# Patient Record
Sex: FEMALE | Race: WHITE | Employment: UNEMPLOYED | URBAN - METROPOLITAN AREA
[De-identification: names, ages, dates, MRNs, and addresses within clinical notes are randomized per-mention and may not be internally consistent; named-entity substitution may affect disease eponyms.]

---

## 2019-06-14 ENCOUNTER — ANCILLARY PROCEDURE (OUTPATIENT)
Dept: MAMMOGRAPHY | Facility: CLINIC | Age: 39
End: 2019-06-14
Payer: COMMERCIAL

## 2019-06-14 ENCOUNTER — OFFICE VISIT (OUTPATIENT)
Dept: OBGYN | Facility: CLINIC | Age: 39
End: 2019-06-14
Payer: COMMERCIAL

## 2019-06-14 VITALS — DIASTOLIC BLOOD PRESSURE: 78 MMHG | BODY MASS INDEX: 31.95 KG/M2 | WEIGHT: 192 LBS | SYSTOLIC BLOOD PRESSURE: 122 MMHG

## 2019-06-14 DIAGNOSIS — Z12.4 SCREENING FOR CERVICAL CANCER: ICD-10-CM

## 2019-06-14 DIAGNOSIS — Z00.00 ANNUAL PHYSICAL EXAM: ICD-10-CM

## 2019-06-14 DIAGNOSIS — L98.9 SKIN LESION: ICD-10-CM

## 2019-06-14 DIAGNOSIS — Z11.51 SCREENING FOR HUMAN PAPILLOMAVIRUS: Primary | ICD-10-CM

## 2019-06-14 PROCEDURE — 77067 SCR MAMMO BI INCL CAD: CPT | Mod: TC

## 2019-06-14 PROCEDURE — 99386 PREV VISIT NEW AGE 40-64: CPT | Performed by: OBSTETRICS & GYNECOLOGY

## 2019-06-14 RX ORDER — ACETAMINOPHEN AND CODEINE PHOSPHATE 120; 12 MG/5ML; MG/5ML
0.35 SOLUTION ORAL DAILY
Qty: 84 TABLET | Refills: 3 | Status: SHIPPED | OUTPATIENT
Start: 2019-06-14 | End: 2020-04-20

## 2019-06-14 NOTE — PROGRESS NOTES
SUBJECTIVE:                                                      Diana is a 39 year old  female who presents for annual exam.     No LMP recorded (lmp unknown). (Menstrual status: Postpartum).. Menses are not present due to Mirena IUD.  Using IUD for contraception.  She is not currently considering pregnancy.  Besides routine health maintenance,  she would like to discuss birth control. Has had Mirena for 5 years, would like to discuss something else and also bilateral tubal ligation.  GYNECOLOGIC HISTORY:    Diana is sexually active with 1 male partner(s) and is currently in a monogamous relationship.      History sexually transmitted infections:No STD history    History of abnormal Pap smear: NO - age 30-65 PAP every 5 years with negative HPV co-testing recommended  Family history of breast CA: No  Family history of uterine/ovarian CA: No    Family history of colon CA: No      HISTORY:  OB History    Para Term  AB Living   1 1 1 0 0 1   SAB TAB Ectopic Multiple Live Births   0 0 0 0 0      # Outcome Date GA Lbr Todd/2nd Weight Sex Delivery Anes PTL Lv   1 Term 14 37w3d  3.32 kg (7 lb 5.1 oz) M CS-LTranv Spinal        Name: ADÁN PINEDA      Apgar1: 8  Apgar5: 9     Past Medical History:   Diagnosis Date     Abnormal Pap smear      Hypertension      Past Surgical History:   Procedure Laterality Date      SECTION  2014    Procedure:  SECTION;   Section;  Surgeon: Diana Lyons MD;  Location: [facility]     FRACTURE SURGERY  age 5    factured both wrists roller blading     Family History   Problem Relation Age of Onset     Diabetes Paternal Grandfather      Diabetes Maternal Grandfather      Diabetes Father      Myocardial Infarction Father      Social History     Socioeconomic History     Marital status:      Spouse name: None     Number of children: None     Years of education: None     Highest education level: None   Occupational  History     None   Social Needs     Financial resource strain: None     Food insecurity:     Worry: None     Inability: None     Transportation needs:     Medical: None     Non-medical: None   Tobacco Use     Smoking status: Current Every Day Smoker     Packs/day: 0.50     Years: 15.00     Pack years: 7.50     Last attempt to quit: 2013     Years since quittin.9     Smokeless tobacco: Never Used   Substance and Sexual Activity     Alcohol use: Yes     Comment: before finding out she was pregnant     Drug use: No     Sexual activity: Yes     Partners: Male   Lifestyle     Physical activity:     Days per week: None     Minutes per session: None     Stress: None   Relationships     Social connections:     Talks on phone: None     Gets together: None     Attends [employer] service: None     Active member of club or organization: None     Attends meetings of clubs or organizations: None     Relationship status: None     Intimate partner violence:     Fear of current or ex partner: None     Emotionally abused: None     Physically abused: None     Forced sexual activity: None   Other Topics Concern     Parent/sibling w/ CABG, MI or angioplasty before 65F 55M? Not Asked   Social History Narrative     None       Current Outpatient Medications:      Multiple Vitamin (MULTI-VITAMIN PO), Take 1 tablet by mouth daily, Disp: , Rfl:      norethindrone (MICRONOR) 0.35 MG tablet, Take 1 tablet (0.35 mg) by mouth daily, Disp: 84 tablet, Rfl: 3     ferrous sulfate 325 (65 FE) MG tablet, Take 1 tablet (325 mg) by mouth 3 times daily (with meals) (Patient not taking: Reported on 2019), Disp: 90 tablet, Rfl: 2     folic acid (FOLVITE) 400 MCG tablet, Take 400 mcg by mouth daily, Disp: , Rfl:      NIFEdipine osmotic (PROCARDIA XL) 30 MG 24 hr tablet, Take 1 tablet (30 mg) by mouth daily, Disp: 30 tablet, Rfl: 0     oxyCODONE-acetaminophen (PERCOCET) 5-325 MG per tablet, Take 1-2 tablets by mouth every 4 hours as needed  (Patient not taking: Reported on 6/14/2019), Disp: 40 tablet, Rfl: 0     senna-docusate (SENOKOT-S;PERICOLACE) 8.6-50 MG per tablet, Take 1 tablet by mouth 2 times daily (Patient not taking: Reported on 6/14/2019), Disp: 60 tablet, Rfl: 1   No Known Allergies    Past medical, surgical, social and family history were reviewed and updated in EPIC.    ROS:   12 point review of systems negative other than symptoms noted below.      OBJECTIVE:                                                      EXAM:  /78 (BP Location: Right arm, Patient Position: Chair, Cuff Size: Adult Large)   Wt 87.1 kg (192 lb)   LMP  (LMP Unknown)   Breastfeeding? No   BMI 31.95 kg/m     BMI: Body mass index is 31.95 kg/m .  General: Alert and oriented, no distress.  Psychiatric: Mood and affect within normal limits.  Skin: Warm and dry, no lesions, rashes or discolorations.  Neck: Neck supple. Thyroid palpbably normal in size and without nodularity.  Cardiovascular: Regular rate and rhythm, no murmurs, rubs or gallops.   Lungs:  Clear to auscultation bilaterally, breathing is unlabored.  Breasts:  Symmetric, no skin changes.  No dominant masses bilaterally.   Lymph:  No cervical, supraclavicular, infraclavicular, axillary or inguinal lymphadenopathy palpable.   Abdomen: Soft, nontender, no hepatosplenomegaly, no rebound or guarding, no masses, no hernias.   Vulva:  No external lesions, normal female hair distribution, no inguinal adenopathy.    Urethra:  Midline, non-tender, well supported, no discharge  Vagina:  Well-estrogenized, no abnormal discharge, no lesions  Cervix: no lesions, no discharge and IUD strings easily seen. The strings are grasped and the IUD removed easily and found to be intact.  Uterus:  anteverted, smooth contour, without enlargement, mobile, and without tenderness  Ovaries:  No masses appreciated, non-tender, mobile  Rectal Exam: deferred  Musculoskeletal: extremities normal. Small skin lesion on her right shin,  bothers her and she would like derm referral.      COUNSELING:   Reviewed preventive health counseling, as reflected in patient instructions  Special attention given to:        Regular exercise       Healthy diet/nutrition       Contraception       Family planning   reports that she has been smoking.  She has a 7.50 pack-year smoking history. She has never used smokeless tobacco.  Tobacco Cessation Action Plan: Information offered: Patient not interested at this time      ASSESSMENT/PLAN:                                                      39 year old female with satisfactory annual exam  (  (L98.9) Skin lesion  Comment:   Plan: DERMATOLOGY REFERRAL            (Z00.00) Annual physical exam  Comment:   Plan: MA Screening Digital Bilateral, norethindrone         (MICRONOR) 0.35 MG tablet          Discussed birth control options: she would like to use progesterone only oral contraceptive pills, as she is not a candidate for estrogen due to age and smoking. She is interested in tubal ligation: risks, benefits, and alternatives discussed and she will consider timing for this.    Mammo ordered.      Edilma Cabello MD

## 2019-06-14 NOTE — NURSING NOTE
"Chief Complaint   Patient presents with     Physical     Pap due     IUD     removal Mirena       Initial /90 (BP Location: Right arm, Patient Position: Chair, Cuff Size: Adult Regular)   Wt 87.1 kg (192 lb)   LMP  (LMP Unknown)   Breastfeeding? No   BMI 31.95 kg/m   Estimated body mass index is 31.95 kg/m  as calculated from the following:    Height as of 14: 1.651 m (5' 5\").    Weight as of this encounter: 87.1 kg (192 lb).  BP completed using cuff size: large    Questioned patient about current smoking habits.  Pt. currently smokes.  Advised about smoking cessation.          The following HM Due: pap smear    Last Pap early   NIL   HPV Neg.        Catarina Fields, CMA on 2019 at 9:57 AM             "

## 2019-07-30 ENCOUNTER — OFFICE VISIT (OUTPATIENT)
Dept: DERMATOLOGY | Facility: CLINIC | Age: 39
End: 2019-07-30
Payer: COMMERCIAL

## 2019-07-30 VITALS — HEART RATE: 77 BPM | DIASTOLIC BLOOD PRESSURE: 81 MMHG | SYSTOLIC BLOOD PRESSURE: 129 MMHG

## 2019-07-30 DIAGNOSIS — L57.8 SOLAR ELASTOSIS: ICD-10-CM

## 2019-07-30 DIAGNOSIS — D48.5 NEOPLASM OF UNCERTAIN BEHAVIOR OF SKIN: Primary | ICD-10-CM

## 2019-07-30 PROCEDURE — 11102 TANGNTL BX SKIN SINGLE LES: CPT | Performed by: PHYSICIAN ASSISTANT

## 2019-07-30 PROCEDURE — 11103 TANGNTL BX SKIN EA SEP/ADDL: CPT | Performed by: PHYSICIAN ASSISTANT

## 2019-07-30 PROCEDURE — 99203 OFFICE O/P NEW LOW 30 MIN: CPT | Mod: 25 | Performed by: PHYSICIAN ASSISTANT

## 2019-07-30 PROCEDURE — 88305 TISSUE EXAM BY PATHOLOGIST: CPT | Mod: TC | Performed by: PHYSICIAN ASSISTANT

## 2019-07-30 NOTE — PATIENT INSTRUCTIONS
Wound Care Instructions     FOR SUPERFICIAL WOUNDS     [facility] [phone]    [facility] [phone]          AFTER 24 HOURS YOU SHOULD REMOVE THE BANDAGE AND BEGIN DAILY DRESSING CHANGES AS FOLLOWS:     1) Remove Dressing.     2) Clean and dry the area with tap water using a Q-tip or sterile gauze pad.     3) Apply Vaseline, Aquaphor, Polysporin ointment or Bacitracin ointment over entire wound.  Do NOT use Neosporin ointment.     4) Cover the wound with a band-aid, or a sterile non-stick gauze pad and micropore paper tape      REPEAT THESE INSTRUCTIONS AT LEAST ONCE A DAY UNTIL THE WOUND HAS COMPLETELY HEALED.    It is an old wives tale that a wound heals better when it is exposed to air and allowed to dry out. The wound will heal faster with a better cosmetic result if it is kept moist with ointment and covered with a bandage.    **Do not let the wound dry out.**      Supplies Needed:      *Cotton tipped applicators (Q-tips)    *Polysporin Ointment or Bacitracin Ointment (NOT NEOSPORIN)    *Band-aids or non-stick gauze pads and micropore paper tape.      PATIENT INFORMATION:    During the healing process you will notice a number of changes. All wounds develop a small halo of redness surrounding the wound.  This means healing is occurring. Severe itching with extensive redness usually indicates sensitivity to the ointment or bandage tape used to dress the wound.  You should call our office if this develops.      Swelling  and/or discoloration around your surgical site is common, particularly when performed around the eye.    All wounds normally drain.  The larger the wound the more drainage there will be.  After 7-10 days, you will notice the wound beginning to shrink and new skin will begin to grow.  The wound is healed when you can see skin has formed over the entire area.  A healed wound has a healthy, shiny look to the surface and is red to dark pink in color to  normalize.  Wounds may take approximately 4-6 weeks to heal.  Larger wounds may take 6-8 weeks.  After the wound is healed you may discontinue dressing changes.    You may experience a sensation of tightness as your wound heals. This is normal and will gradually subside.    Your healed wound may be sensitive to temperature changes. This sensitivity improves with time, but if you re having a lot of discomfort, try to avoid temperature extremes.    Patients frequently experience itching after their wound appears to have healed because of the continue healing under the skin.  Plain Vaseline will help relieve the itching.        POSSIBLE COMPLICATIONS    BLEEDIN. Leave the bandage in place.  2. Use tightly rolled up gauze or a cloth to apply direct pressure over the bandage for 30  minutes.  3. Reapply pressure for an additional 30 minutes if necessary  4. Use additional gauze and tape to maintain pressure once the bleeding has stopped.    Proper skin care from [facility]:    -Eliminate harsh soaps as they strip the natural oils from the skin, often resulting in dry itchy skin ( i.e. Dial, Zest, Japanese Spring)  -Use mild soaps such as Cetaphil or Dove Sensitive Skin in the shower. You do not need to use soap on arms, legs, and trunk every time you shower unless visibly soiled.   -Avoid hot or cold showers.  -After showering, lightly dry off and apply moisturizing within 2-3 minutes. This will help trap moisture in the skin.   -Aggressive use of a moisturizer at least 1-2 times a day to the entire body (including -Vanicream, Cetaphil, Aquaphor or Cerave) and moisturize hands after every washing.  -We recommend using moisturizers that come in a tub that needs to be scooped out, not a pump. This has more of an oil base. It will hold moisture in your skin much better than a water base moisturizer. The above recommended are non-pore clogging.      Wear a sunscreen with at least SPF 30 on your face, ears, neck  and V of the chest daily. Wear sunscreen on other areas of the body if those areas are exposed to the sun throughout the day. Sunscreens can contain physical and/or chemical blockers. Physical blockers are less likely to clog pores, these include zinc oxide and titanium dioxide. Reapply every two hour and after swimming. Sunscreen examples include Neutrogena, CeraVe, Blue Lizard, Elta MD and many others.    UV radiation  UVA radiation remains constant throughout the day and throughout the year. It is a longer wavelength than UVB and therefore penetrates deeper into the skin leading to immediate and delayed tanning, photoaging, and skin cancer. 70-80% of UVA and UVB radiation occurs between the hours of 10am-2pm.  UVB radiation  UVB radiation causes the most harmful effects and is more significant during the summer months. However, snow and ice can reflect UVB radiation leading to skin damage during the winter months as well. UVB radiation is responsible for tanning, burning, inflammation, delayed erythema (pinkness), pigmentation (brown spots), and skin cancer.

## 2019-07-30 NOTE — LETTER
2019         RE: Diana Ferrera  [address]  [place] [id number]        Dear Colleague,    Thank you for referring your patient, Diana Ferrera, to the [facility]. Please see a copy of my visit note below.    HPI:  Diana Ferrera is a 39 year old female patient here today for spot on right leg .  Patient states this has been present for a while.  Patient reports the following symptoms: bleeds when she hits with razor .  Patient reports the following previous treatments: none.  Patient reports the following modifying factors: none.  Associated symptoms: none. Also has a mole on left neck. Irritated. Would like it checked.  Patient has no other skin complaints today.  Remainder of the HPI, Meds, PMH, Allergies, FH, and SH was reviewed in chart.    Pertinent Hx:   No personal or family history of skin cancer    Past Medical History:   Diagnosis Date     Abnormal Pap smear      Hypertension        Past Surgical History:   Procedure Laterality Date      SECTION  2014    Procedure:  SECTION;   Section;  Surgeon: Diana Lyons MD;  Location: [facility]     FRACTURE SURGERY  age 5    factured both wrists roller blading        Family History   Problem Relation Age of Onset     Diabetes Paternal Grandfather      Diabetes Maternal Grandfather      Diabetes Father      Myocardial Infarction Father        Social History     Socioeconomic History     Marital status:      Spouse name: Not on file     Number of children: Not on file     Years of education: Not on file     Highest education level: Not on file   Occupational History     Not on file   Social Needs     Financial resource strain: Not on file     Food insecurity:     Worry: Not on file     Inability: Not on file     Transportation needs:     Medical: Not on file     Non-medical: Not on file   Tobacco Use     Smoking status: Current Every Day Smoker     Packs/day: 0.50     Years: 15.00      Pack years: 7.50     Last attempt to quit: 2013     Years since quittin.0     Smokeless tobacco: Never Used   Substance and Sexual Activity     Alcohol use: Yes     Comment: before finding out she was pregnant     Drug use: No     Sexual activity: Yes     Partners: Male   Lifestyle     Physical activity:     Days per week: Not on file     Minutes per session: Not on file     Stress: Not on file   Relationships     Social connections:     Talks on phone: Not on file     Gets together: Not on file     Attends Hindu service: Not on file     Active member of club or organization: Not on file     Attends meetings of clubs or organizations: Not on file     Relationship status: Not on file     Intimate partner violence:     Fear of current or ex partner: Not on file     Emotionally abused: Not on file     Physically abused: Not on file     Forced sexual activity: Not on file   Other Topics Concern     Parent/sibling w/ CABG, MI or angioplasty before 65F 55M? Not Asked   Social History Narrative     Not on file       Outpatient Encounter Medications as of 2019   Medication Sig Dispense Refill     folic acid (FOLVITE) 400 MCG tablet Take 400 mcg by mouth daily       Multiple Vitamin (MULTI-VITAMIN PO) Take 1 tablet by mouth daily       norethindrone (MICRONOR) 0.35 MG tablet Take 1 tablet (0.35 mg) by mouth daily 84 tablet 3     ferrous sulfate 325 (65 FE) MG tablet Take 1 tablet (325 mg) by mouth 3 times daily (with meals) (Patient not taking: Reported on 2019) 90 tablet 2     NIFEdipine osmotic (PROCARDIA XL) 30 MG 24 hr tablet Take 1 tablet (30 mg) by mouth daily 30 tablet 0     oxyCODONE-acetaminophen (PERCOCET) 5-325 MG per tablet Take 1-2 tablets by mouth every 4 hours as needed (Patient not taking: Reported on 2019) 40 tablet 0     senna-docusate (SENOKOT-S;PERICOLACE) 8.6-50 MG per tablet Take 1 tablet by mouth 2 times daily (Patient not taking: Reported on 2019) 60 tablet 1     No  facility-administered encounter medications on file as of 7/30/2019.        Review Of Systems:  Skin: As above  Eyes: negative  Ears/Nose/Throat: negative  Respiratory: No shortness of breath, dyspnea on exertion, cough, or hemoptysis  Cardiovascular: negative  Gastrointestinal: negative  Genitourinary: negative  Musculoskeletal: negative  Neurologic: negative  Psychiatric: negative  Hematologic/Lymphatic/Immunologic: negative  Endocrine: negative      Objective:     /81   Pulse 77   Eyes: Conjunctivae/lids: Normal   ENT: Lips:  Normal  MSK: Normal  Cardiovascular: Peripheral edema none  Pulm: Breathing Normal  Neuro/Psych: Orientation: Normal; Mood/Affect: Normal, NAD, WDWN  Pt accompanied by: self  Following areas examined: face, neck, right and left legs  Kraft skin type:ii   Findings:  Rhytides, hypo/hyperpigmentation, and atrophy  Pink/tan smooth papule on left lateral neck 0.3cm  Brown scaly papule on right ventral distal leg 0.4cm    Assessment and Plan:  1) 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  No complications and routine wound care.  May grow back and will get a scar. Based on lesion type may need to completely remove lesion. Patient will be notified in 7-10 days of results. Wound care directions given.    2) Neoplasm of uncertain behavior left lateral neck 0.3cm  Rule out irritated nevus doubt atypica   TANGENTIAL BIOPSY:  After consent, anesthesia with LEC and prep, tangential biopsy performed.  No complications and routine wound care.  May grow back and will get a scar. Based on lesion type may need to completely remove lesion. Patient will be notified in 7-10 days of results. Wound care directions given.      3) solar elastosis    Signs and Symptoms of non-melanoma skin cancer and ABCDEs of melanoma reviewed with patient. Patient encouraged to perform monthly  self skin exams and educated on how to perform them. UV precautions reviewed with patient. Patient was asked about new or changing moles/lesions on body.   Wear a sunscreen with at least SPF 30 on your face, ears, neck and V of the chest daily. Wear sunscreen on other areas of the body if those areas are exposed to the sun throughout the day. Sunscreens can contain physical and/or chemical blockers. Physical blockers are less likely to clog pores, these include zinc oxide and titanium dioxide. Reapply every two hour and after swimming. Sunscreen examples include Neutrogena, CeraVe, Blue Lizard, Elta MD and many others.      Follow up in yearly FBE      Again, thank you for allowing me to participate in the care of your patient.        Sincerely,        Mali Mcgraw PA-C

## 2019-07-30 NOTE — PROGRESS NOTES
HPI:  Diana Ferrera is a 39 year old female patient here today for spot on right leg .  Patient states this has been present for a while.  Patient reports the following symptoms: bleeds when she hits with razor .  Patient reports the following previous treatments: none.  Patient reports the following modifying factors: none.  Associated symptoms: none. Also has a mole on left neck. Irritated. Would like it checked.  Patient has no other skin complaints today.  Remainder of the HPI, Meds, PMH, Allergies, FH, and SH was reviewed in chart.    Pertinent Hx:   No personal or family history of skin cancer    Past Medical History:   Diagnosis Date     Abnormal Pap smear      Hypertension        Past Surgical History:   Procedure Laterality Date      SECTION  2014    Procedure:  SECTION;   Section;  Surgeon: Diana Lyons MD;  Location: [facility]     FRACTURE SURGERY  age 5    factured both wrists roller blading        Family History   Problem Relation Age of Onset     Diabetes Paternal Grandfather      Diabetes Maternal Grandfather      Diabetes Father      Myocardial Infarction Father        Social History     Socioeconomic History     Marital status:      Spouse name: Not on file     Number of children: Not on file     Years of education: Not on file     Highest education level: Not on file   Occupational History     Not on file   Social Needs     Financial resource strain: Not on file     Food insecurity:     Worry: Not on file     Inability: Not on file     Transportation needs:     Medical: Not on file     Non-medical: Not on file   Tobacco Use     Smoking status: Current Every Day Smoker     Packs/day: 0.50     Years: 15.00     Pack years: 7.50     Last attempt to quit: 2013     Years since quittin.0     Smokeless tobacco: Never Used   Substance and Sexual Activity     Alcohol use: Yes     Comment: before finding out she was pregnant     Drug use: No     Sexual  activity: Yes     Partners: Male   Lifestyle     Physical activity:     Days per week: Not on file     Minutes per session: Not on file     Stress: Not on file   Relationships     Social connections:     Talks on phone: Not on file     Gets together: Not on file     Attends Moravian service: Not on file     Active member of club or organization: Not on file     Attends meetings of clubs or organizations: Not on file     Relationship status: Not on file     Intimate partner violence:     Fear of current or ex partner: Not on file     Emotionally abused: Not on file     Physically abused: Not on file     Forced sexual activity: Not on file   Other Topics Concern     Parent/sibling w/ CABG, MI or angioplasty before 65F 55M? Not Asked   Social History Narrative     Not on file       Outpatient Encounter Medications as of 7/30/2019   Medication Sig Dispense Refill     folic acid (FOLVITE) 400 MCG tablet Take 400 mcg by mouth daily       Multiple Vitamin (MULTI-VITAMIN PO) Take 1 tablet by mouth daily       norethindrone (MICRONOR) 0.35 MG tablet Take 1 tablet (0.35 mg) by mouth daily 84 tablet 3     ferrous sulfate 325 (65 FE) MG tablet Take 1 tablet (325 mg) by mouth 3 times daily (with meals) (Patient not taking: Reported on 6/14/2019) 90 tablet 2     NIFEdipine osmotic (PROCARDIA XL) 30 MG 24 hr tablet Take 1 tablet (30 mg) by mouth daily 30 tablet 0     oxyCODONE-acetaminophen (PERCOCET) 5-325 MG per tablet Take 1-2 tablets by mouth every 4 hours as needed (Patient not taking: Reported on 6/14/2019) 40 tablet 0     senna-docusate (SENOKOT-S;PERICOLACE) 8.6-50 MG per tablet Take 1 tablet by mouth 2 times daily (Patient not taking: Reported on 6/14/2019) 60 tablet 1     No facility-administered encounter medications on file as of 7/30/2019.        Review Of Systems:  Skin: As above  Eyes: negative  Ears/Nose/Throat: negative  Respiratory: No shortness of breath, dyspnea on exertion, cough, or  hemoptysis  Cardiovascular: negative  Gastrointestinal: negative  Genitourinary: negative  Musculoskeletal: negative  Neurologic: negative  Psychiatric: negative  Hematologic/Lymphatic/Immunologic: negative  Endocrine: negative      Objective:     /81   Pulse 77   Eyes: Conjunctivae/lids: Normal   ENT: Lips:  Normal  MSK: Normal  Cardiovascular: Peripheral edema none  Pulm: Breathing Normal  Neuro/Psych: Orientation: Normal; Mood/Affect: Normal, NAD, WDWN  Pt accompanied by: self  Following areas examined: face, neck, right and left legs  Kraft skin type:ii   Findings:  Rhytides, hypo/hyperpigmentation, and atrophy  Pink/tan smooth papule on left lateral neck 0.3cm  Brown scaly papule on right ventral distal leg 0.4cm    Assessment and Plan:  1) 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  No complications and routine wound care.  May grow back and will get a scar. Based on lesion type may need to completely remove lesion. Patient will be notified in 7-10 days of results. Wound care directions given.    2) Neoplasm of uncertain behavior left lateral neck 0.3cm  Rule out irritated nevus doubt atypica   TANGENTIAL BIOPSY:  After consent, anesthesia with LEC and prep, tangential biopsy performed.  No complications and routine wound care.  May grow back and will get a scar. Based on lesion type may need to completely remove lesion. Patient will be notified in 7-10 days of results. Wound care directions given.      3) solar elastosis    Signs and Symptoms of non-melanoma skin cancer and ABCDEs of melanoma reviewed with patient. Patient encouraged to perform monthly self skin exams and educated on how to perform them. UV precautions reviewed with patient. Patient was asked about new or changing moles/lesions on body.   Wear a sunscreen with at least SPF 30 on your face, ears, neck and V  of the chest daily. Wear sunscreen on other areas of the body if those areas are exposed to the sun throughout the day. Sunscreens can contain physical and/or chemical blockers. Physical blockers are less likely to clog pores, these include zinc oxide and titanium dioxide. Reapply every two hour and after swimming. Sunscreen examples include Neutrogena, CeraVe, Blue Lizard, Elta MD and many others.      Follow up in yearly [facility]BE

## 2019-08-02 LAB — COPATH REPORT: NORMAL

## 2019-08-05 ENCOUNTER — TELEPHONE (OUTPATIENT)
Dept: DERMATOLOGY | Facility: CLINIC | Age: 39
End: 2019-08-05

## 2019-08-05 NOTE — TELEPHONE ENCOUNTER
----- Message from Darin Marcelo MD sent at 8/5/2019  7:16 AM CDT -----  FINAL DIAGNOSIS:   A. Shave, right ventral distal leg:   - Benign dermal spindle cell proliferation, consistent with dermatofibroma    - (see description)     B. Shave, left lateral neck:   - Intradermal melanocytic nevus - (see description)     No further treatment

## 2019-08-05 NOTE — TELEPHONE ENCOUNTER
Called and LM for patient to call back in regards to biopsy results lex Merrill RN-BSN  [facility]  [phone]

## 2019-08-06 NOTE — TELEPHONE ENCOUNTER
Called and LM for patient to call back in regards to biopsy results lex Merrill RN-BSN  [facility]  [phone]

## 2019-08-06 NOTE — TELEPHONE ENCOUNTER
Called and LM for patient to call back in regards to biopsy results lex Merrill RN-BSN  [facility]  [phone]

## 2019-08-07 NOTE — TELEPHONE ENCOUNTER
Called and spoke to patient. Educated patient on biopsy results- dermatofibroma + intradermal melanocytic nevus (both benign). No further treatment needed, patient voiced understanding.    Garfield RN-BSN  [facility]  [phone]

## 2020-03-02 ENCOUNTER — HEALTH MAINTENANCE LETTER (OUTPATIENT)
Age: 40
End: 2020-03-02

## 2020-08-17 ENCOUNTER — ANCILLARY PROCEDURE (OUTPATIENT)
Dept: MAMMOGRAPHY | Facility: CLINIC | Age: 40
End: 2020-08-17
Payer: COMMERCIAL

## 2020-08-17 DIAGNOSIS — Z12.31 VISIT FOR SCREENING MAMMOGRAM: ICD-10-CM

## 2020-08-17 PROCEDURE — 77067 SCR MAMMO BI INCL CAD: CPT | Mod: TC

## 2020-08-17 PROCEDURE — 77063 BREAST TOMOSYNTHESIS BI: CPT | Mod: TC

## 2020-09-25 ENCOUNTER — OFFICE VISIT (OUTPATIENT)
Dept: OBGYN | Facility: CLINIC | Age: 40
End: 2020-09-25
Payer: COMMERCIAL

## 2020-09-25 VITALS
DIASTOLIC BLOOD PRESSURE: 92 MMHG | SYSTOLIC BLOOD PRESSURE: 138 MMHG | BODY MASS INDEX: 29.99 KG/M2 | HEIGHT: 65 IN | WEIGHT: 180 LBS

## 2020-09-25 DIAGNOSIS — Z12.4 PAP SMEAR FOR CERVICAL CANCER SCREENING: ICD-10-CM

## 2020-09-25 DIAGNOSIS — Z01.419 WOMEN'S ANNUAL ROUTINE GYNECOLOGICAL EXAMINATION: Primary | ICD-10-CM

## 2020-09-25 DIAGNOSIS — Z00.00 ANNUAL PHYSICAL EXAM: ICD-10-CM

## 2020-09-25 DIAGNOSIS — Z23 NEED FOR PROPHYLACTIC VACCINATION AND INOCULATION AGAINST INFLUENZA: ICD-10-CM

## 2020-09-25 PROCEDURE — 90471 IMMUNIZATION ADMIN: CPT | Performed by: OBSTETRICS & GYNECOLOGY

## 2020-09-25 PROCEDURE — G0145 SCR C/V CYTO,THINLAYER,RESCR: HCPCS | Performed by: OBSTETRICS & GYNECOLOGY

## 2020-09-25 PROCEDURE — 90686 IIV4 VACC NO PRSV 0.5 ML IM: CPT | Performed by: OBSTETRICS & GYNECOLOGY

## 2020-09-25 PROCEDURE — 87624 HPV HI-RISK TYP POOLED RSLT: CPT | Performed by: OBSTETRICS & GYNECOLOGY

## 2020-09-25 PROCEDURE — 99396 PREV VISIT EST AGE 40-64: CPT | Mod: 25 | Performed by: OBSTETRICS & GYNECOLOGY

## 2020-09-25 RX ORDER — OMEGA-3/DHA/EPA/FISH OIL 60 MG-90MG
CAPSULE ORAL
COMMUNITY
End: 2024-04-03

## 2020-09-25 RX ORDER — ACETAMINOPHEN AND CODEINE PHOSPHATE 120; 12 MG/5ML; MG/5ML
0.35 SOLUTION ORAL DAILY
Qty: 84 TABLET | Refills: 3 | Status: SHIPPED | OUTPATIENT
Start: 2020-09-25 | End: 2020-12-29

## 2020-09-25 ASSESSMENT — MIFFLIN-ST. JEOR: SCORE: 1477.35

## 2020-09-25 NOTE — NURSING NOTE
"Chief Complaint   Patient presents with     Gyn Exam     Annual- costo pain        Initial BP (!) 138/92 (BP Location: Left arm, Patient Position: Sitting, Cuff Size: Adult Large)   Ht 1.651 m (5' 5\")   Wt 81.6 kg (180 lb)   LMP  (LMP Unknown)   BMI 29.95 kg/m   Estimated body mass index is 29.95 kg/m  as calculated from the following:    Height as of this encounter: 1.651 m (5' 5\").    Weight as of this encounter: 81.6 kg (180 lb).  BP completed using cuff size: regular    Questioned patient about current smoking habits.  Pt. has never smoked.          The following HM Due: NONE    Ke Bhakta CMA                "

## 2020-09-25 NOTE — PROGRESS NOTES
SUBJECTIVE:                                                      Diana is a 40 year old  female who presents for annual exam.     No LMP recorded (lmp unknown). (Menstrual status: Irregular Periods).. Menses are regular q 28-30 days and normal lasting 4-5 days.  Using oral contraceptives for contraception.  She is not currently considering pregnancy.  Besides routine health maintenance,  she would like to discuss switching back to an IUD. She liked the Mirena and doesn't like taking a pill every day.  GYNECOLOGIC HISTORY:    Diana is sexually active with 1 male partner(s) and is currently in a monogamous relationship.      History sexually transmitted infections:No STD history    History of abnormal Pap smear: NO - age 30-65 PAP every 5 years with negative HPV co-testing recommended  Family history of breast CA: No  Family history of uterine/ovarian CA: No    Family history of colon CA: No      HISTORY:  OB History    Para Term  AB Living   1 1 1 0 0 1   SAB TAB Ectopic Multiple Live Births   0 0 0 0 0      # Outcome Date GA Lbr Todd/2nd Weight Sex Delivery Anes PTL Lv   1 Term 14 37w3d  3.32 kg (7 lb 5.1 oz) M CS-LTranv Spinal        Name: ADÁN PINEDA      Apgar1: 8  Apgar5: 9     Past Medical History:   Diagnosis Date     Abnormal Pap smear      Hypertension      Past Surgical History:   Procedure Laterality Date      SECTION  2014    Procedure:  SECTION;   Section;  Surgeon: Diana Lyons MD;  [facility]     FRACTURE SURGERY  age 5    factured both wrists roller blading     Family History   Problem Relation Age of Onset     Diabetes Paternal Grandfather      Diabetes Maternal Grandfather      Diabetes Father      Myocardial Infarction Father      Social History     Socioeconomic History     Marital status:      Spouse name: None     Number of children: None     Years of education: None     Highest education level: None    Occupational History     None   Social Needs     Financial resource strain: None     Food insecurity     Worry: None     Inability: None     Transportation needs     Medical: None     Non-medical: None   Tobacco Use     Smoking status: Current Every Day Smoker     Packs/day: 0.50     Years: 15.00     Pack years: 7.50     Last attempt to quit: 2013     Years since quittin.2     Smokeless tobacco: Never Used   Substance and Sexual Activity     Alcohol use: Yes     Comment: before finding out she was pregnant     Drug use: No     Sexual activity: Yes     Partners: Male   Lifestyle     Physical activity     Days per week: None     Minutes per session: None     Stress: None   Relationships     Social connections     Talks on phone: None     Gets together: None     Attends Voodoo service: None     Active member of club or organization: None     Attends meetings of clubs or organizations: None     Relationship status: None     Intimate partner violence     Fear of current or ex partner: None     Emotionally abused: None     Physically abused: None     Forced sexual activity: None   Other Topics Concern     Parent/sibling w/ CABG, MI or angioplasty before 65F 55M? Not Asked   Social History Narrative     None       Current Outpatient Medications:      Multiple Vitamin (MULTI-VITAMIN PO), Take 1 tablet by mouth daily, Disp: , Rfl:      NORLYDA 0.35 MG tablet, TAKE 1 TABLET BY MOUTH DAILY, Disp: 84 tablet, Rfl: 0     Omega-3 Fatty Acids (FISH OIL) 500 MG CAPS, , Disp: , Rfl:      ferrous sulfate 325 (65 FE) MG tablet, Take 1 tablet (325 mg) by mouth 3 times daily (with meals) (Patient not taking: Reported on 2020), Disp: 90 tablet, Rfl: 2     folic acid (FOLVITE) 400 MCG tablet, Take 400 mcg by mouth daily, Disp: , Rfl:    No Known Allergies    Past medical, surgical, social and family history were reviewed and updated in EPIC.    ROS:   12 point review of systems negative other than symptoms noted  "below.      OBJECTIVE:                                                      EXAM:  BP (!) 138/92 (BP Location: Left arm, Patient Position: Sitting, Cuff Size: Adult Large)   Ht 1.651 m (5' 5\")   Wt 81.6 kg (180 lb)   LMP  (LMP Unknown)   BMI 29.95 kg/m     BMI: Body mass index is 29.95 kg/m .  General: Alert and oriented, no distress.  Psychiatric: Mood and affect within normal limits.  Skin: Warm and dry, no lesions, rashes or discolorations.  Neck: Neck supple. Thyroid palpbably normal in size and without nodularity.  Cardiovascular: Regular rate and rhythm, no murmurs, rubs or gallops.   Lungs:  Clear to auscultation bilaterally, breathing is unlabored.  Breasts:  Symmetric, no skin changes.  No dominant masses bilaterally.   Lymph:  No cervical, supraclavicular, infraclavicular, axillary or inguinal lymphadenopathy palpable.   Abdomen: Soft, nontender, no hepatosplenomegaly, no rebound or guarding, no masses, no hernias.   Vulva:  No external lesions, normal female hair distribution, no inguinal adenopathy.    Urethra:  Midline, non-tender, well supported, no discharge  Vagina:  Well-estrogenized, no abnormal discharge, no lesions  Cervix: no lesions, no discharge  Uterus:  anteverted, smooth contour, without enlargement, mobile, and without tenderness  Ovaries:  No masses appreciated, non-tender, mobile  Rectal Exam: deferred  Musculoskeletal: extremities normal      COUNSELING:   Reviewed preventive health counseling, as reflected in patient instructions       Regular exercise       Healthy diet/nutrition       Contraception   reports that she has been smoking. She has a 7.50 pack-year smoking history. She has never used smokeless tobacco.  Tobacco Cessation Action Plan: Information offered: Patient not interested at this time      ASSESSMENT/PLAN:                                                      40 year old female with satisfactory annual exam  (Z01.419) Women's annual routine gynecological " examination  (primary encounter diagnosis)  Comment:   Plan: pap and HPV today. Mammo yearly.    (Z00.00) Annual physical exam  Comment:   Plan: norethindrone (NORLYDA) 0.35 MG tablet        Will renew this, plans to come back for Mirena IUD. Risks, benefits, and alternatives discussed and she has used this before and has no questions.    (Z12.4) Pap smear for cervical cancer screening  Comment:   Plan: Pap imaged thin layer screen with HPV -         recommended age 30 - 65 years (select HPV order        below), HPV High Risk Types DNA Cervical            (Z23) Need for prophylactic vaccination and inoculation against influenza  Comment:   Plan: INFLUENZA VACCINE IM > 6 MONTHS VALENT IIV4         [12446], Vaccine Administration, Initial         [74948]              Edilma Cabello MD

## 2020-09-30 LAB
COPATH REPORT: NORMAL
PAP: NORMAL

## 2020-10-01 LAB
FINAL DIAGNOSIS: NORMAL
HPV HR 12 DNA CVX QL NAA+PROBE: NEGATIVE
HPV16 DNA SPEC QL NAA+PROBE: NEGATIVE
HPV18 DNA SPEC QL NAA+PROBE: NEGATIVE
SPECIMEN DESCRIPTION: NORMAL
SPECIMEN SOURCE CVX/VAG CYTO: NORMAL

## 2020-10-07 ENCOUNTER — PATIENT OUTREACH (OUTPATIENT)
Dept: OBGYN | Facility: CLINIC | Age: 40
End: 2020-10-07

## 2020-10-07 NOTE — TELEPHONE ENCOUNTER
1995 Branch: mild dysplasia  1/1996 LEEP  2004, 2005, 2007, 2007 - all NIL paps  11/28/08 LSIL Pap  12/15/08 Branch Bx: YAO 2-3. Plan LEEP  1/29/09 LEEP: YAO 2-3, margins neg. Plan yearly paps until 2029  1/7/10 NIL pap, neg HR HPV  12/6/10 NIL pap, neg HR HPV  4/16/12 ASCUS pap, neg HR HPV  2/9/16 NIL pap, neg HR HPV  9/25/20 NIL pap, neg HR HPV. Plan 1 year cotest

## 2020-11-05 ENCOUNTER — OFFICE VISIT (OUTPATIENT)
Dept: OBGYN | Facility: CLINIC | Age: 40
End: 2020-11-05
Payer: COMMERCIAL

## 2020-11-05 VITALS
HEIGHT: 65 IN | SYSTOLIC BLOOD PRESSURE: 126 MMHG | BODY MASS INDEX: 30.16 KG/M2 | DIASTOLIC BLOOD PRESSURE: 78 MMHG | WEIGHT: 181 LBS

## 2020-11-05 DIAGNOSIS — Z30.430 ENCOUNTER FOR INSERTION OF INTRAUTERINE CONTRACEPTIVE DEVICE: Primary | ICD-10-CM

## 2020-11-05 PROCEDURE — 58300 INSERT INTRAUTERINE DEVICE: CPT | Performed by: OBSTETRICS & GYNECOLOGY

## 2020-11-05 ASSESSMENT — MIFFLIN-ST. JEOR: SCORE: 1481.89

## 2020-11-05 NOTE — NURSING NOTE
"Chief Complaint   Patient presents with     IUD       Initial /78   Ht 1.651 m (5' 5\")   Wt 82.1 kg (181 lb)   LMP 10/05/2020   Breastfeeding No   BMI 30.12 kg/m   Estimated body mass index is 30.12 kg/m  as calculated from the following:    Height as of this encounter: 1.651 m (5' 5\").    Weight as of this encounter: 82.1 kg (181 lb).  BP completed using cuff size: regular    Questioned patient about current smoking habits.  Pt. has never smoked.          The following HM Due: NONE      The following patient reported/Care Every where data was sent to:  P ABSTRACT QUALITY INITIATIVES [[id number]]  Mali Dockery LPN               "

## 2020-11-05 NOTE — PROGRESS NOTES
INDICATIONS:                                                      Diana Ferrera is a 40 year old female,   who presents for insertion of an IUD. The risks, benefits and alternatives of IUD insertion were discussed in detail previously. She also has reviewed the product brochure.  She has elected to go ahead with the insertion today and her questions were answered. Consent was signed.  LMP Patient's last menstrual period was 10/05/2020. and was normal in duration and amount of flow. A pregnancy was performed today:  No    PROCEDURE:                                                      The pelvic exam revealed normal external genitalia. On bimanual exam the uterus was Anteverted and normal in size with no tenderness present. A speculum was inserted into the vagina and the cervix was visualized. The cervix was prepped with Betadine . The anterior lip of the cervix was grasped with a single toothed tenaculum. The uterus sounded to 7 cm. A Mirena IUD was then inserted without difficulty. The string was cut to 3 cm.  The patient experienced a mild amount of cramping.    POST PROCEDURE:                                                      She  tolerated the procedure well. There were no complications. Patient was discharged in stable condition.    Return to clinic in 4-6 weeks for IUD check.  Call if severe cramping, fever, abnormal bleeding, abnormal discharge or pelvic pain develop.  Patient was counseled about the chance of irregular bleeding.    Edilma Cabello MD

## 2020-11-30 ENCOUNTER — ANCILLARY PROCEDURE (OUTPATIENT)
Dept: GENERAL RADIOLOGY | Facility: CLINIC | Age: 40
End: 2020-11-30
Attending: FAMILY MEDICINE
Payer: COMMERCIAL

## 2020-11-30 ENCOUNTER — OFFICE VISIT (OUTPATIENT)
Dept: URGENT CARE | Facility: URGENT CARE | Age: 40
End: 2020-11-30
Payer: COMMERCIAL

## 2020-11-30 ENCOUNTER — NURSE TRIAGE (OUTPATIENT)
Dept: NURSING | Facility: CLINIC | Age: 40
End: 2020-11-30

## 2020-11-30 VITALS
DIASTOLIC BLOOD PRESSURE: 80 MMHG | TEMPERATURE: 98.2 F | SYSTOLIC BLOOD PRESSURE: 114 MMHG | RESPIRATION RATE: 18 BRPM | WEIGHT: 180 LBS | HEIGHT: 65 IN | OXYGEN SATURATION: 99 % | BODY MASS INDEX: 29.99 KG/M2 | HEART RATE: 95 BPM

## 2020-11-30 DIAGNOSIS — R07.9 CHEST PAIN, UNSPECIFIED TYPE: Primary | ICD-10-CM

## 2020-11-30 LAB
BASOPHILS # BLD AUTO: 0 10E9/L (ref 0–0.2)
BASOPHILS NFR BLD AUTO: 0.3 %
D DIMER PPP FEU-MCNC: 0.4 UG/ML FEU (ref 0–0.5)
DIFFERENTIAL METHOD BLD: NORMAL
EOSINOPHIL # BLD AUTO: 0.1 10E9/L (ref 0–0.7)
EOSINOPHIL NFR BLD AUTO: 1.5 %
ERYTHROCYTE [DISTWIDTH] IN BLOOD BY AUTOMATED COUNT: 12.7 % (ref 10–15)
HCT VFR BLD AUTO: 46.6 % (ref 35–47)
HGB BLD-MCNC: 15.5 G/DL (ref 11.7–15.7)
LYMPHOCYTES # BLD AUTO: 2.7 10E9/L (ref 0.8–5.3)
LYMPHOCYTES NFR BLD AUTO: 29.6 %
MCH RBC QN AUTO: 32.2 PG (ref 26.5–33)
MCHC RBC AUTO-ENTMCNC: 33.3 G/DL (ref 31.5–36.5)
MCV RBC AUTO: 97 FL (ref 78–100)
MONOCYTES # BLD AUTO: 0.6 10E9/L (ref 0–1.3)
MONOCYTES NFR BLD AUTO: 6.6 %
NEUTROPHILS # BLD AUTO: 5.7 10E9/L (ref 1.6–8.3)
NEUTROPHILS NFR BLD AUTO: 62 %
PLATELET # BLD AUTO: 228 10E9/L (ref 150–450)
RBC # BLD AUTO: 4.81 10E12/L (ref 3.8–5.2)
TROPONIN I SERPL-MCNC: <0.015 UG/L (ref 0–0.04)
WBC # BLD AUTO: 9.2 10E9/L (ref 4–11)

## 2020-11-30 PROCEDURE — 85379 FIBRIN DEGRADATION QUANT: CPT | Performed by: FAMILY MEDICINE

## 2020-11-30 PROCEDURE — 84484 ASSAY OF TROPONIN QUANT: CPT | Performed by: FAMILY MEDICINE

## 2020-11-30 PROCEDURE — 93000 ELECTROCARDIOGRAM COMPLETE: CPT | Performed by: FAMILY MEDICINE

## 2020-11-30 PROCEDURE — 99204 OFFICE O/P NEW MOD 45 MIN: CPT | Performed by: FAMILY MEDICINE

## 2020-11-30 PROCEDURE — 71046 X-RAY EXAM CHEST 2 VIEWS: CPT | Performed by: RADIOLOGY

## 2020-11-30 PROCEDURE — 36415 COLL VENOUS BLD VENIPUNCTURE: CPT | Performed by: FAMILY MEDICINE

## 2020-11-30 PROCEDURE — 85025 COMPLETE CBC W/AUTO DIFF WBC: CPT | Performed by: FAMILY MEDICINE

## 2020-11-30 RX ORDER — NAPROXEN 500 MG/1
500 TABLET ORAL 2 TIMES DAILY WITH MEALS
Qty: 14 TABLET | Refills: 0 | Status: SHIPPED | OUTPATIENT
Start: 2020-11-30 | End: 2020-12-07

## 2020-11-30 ASSESSMENT — MIFFLIN-ST. JEOR: SCORE: 1473.38

## 2020-11-30 NOTE — TELEPHONE ENCOUNTER
Patient complains of intermittent chest pain/pressure for the past couple of months that mostly happens at night when she is lying down; no breathing difficulties.  Her OB provider suggested trying Prilosec, which she did with no relief.  Advised to go to ED/UC now as no PCP.  Patient prefers to establish care and schedule office visit; transferred to scheduling.     Additional Information    Negative: Severe difficulty breathing (e.g., struggling for each breath, speaks in single words)    Negative: Passed out (i.e., fainted, collapsed and was not responding)    Negative: Chest pain lasting longer than 5 minutes and ANY of the following:* Over 50 years old* Over 30 years old and at least one cardiac risk factor (i.e., high blood pressure, diabetes, high cholesterol, obesity, smoker or strong family history of heart disease)* Pain is crushing, pressure-like, or heavy * Took nitroglycerin and chest pain was not relieved* History of heart disease (i.e., angina, heart attack, bypass surgery, angioplasty, CHF)    Negative: Visible sweat on face or sweat dripping down face    Negative: Sounds like a life-threatening emergency to the triager    Negative: Followed an injury to chest    Negative: SEVERE chest pain    Negative: Pain also present in shoulder(s) or arm(s) or jaw    Negative: Difficulty breathing    Negative: Cocaine use within last 3 days    Negative: History of prior 'blood clot' in leg or lungs (i.e., deep vein thrombosis, pulmonary embolism)    Negative: Recent illness requiring prolonged bed rest (i.e., immobilization)    Negative: Hip or leg fracture in past 2 months (e.g, or had cast on leg or ankle)    Negative: Major surgery in the past month    Negative: Recent long-distance travel with prolonged time in car, bus, plane, or train (i.e., within past 2 weeks; 6 or more hours duration)    Negative: Heart beating irregularly or very rapidly    Negative: Chest pain lasting longer than 5 minutes     Intermittent chest pain and pain has been increasing in severity or frequency    Protocols used: CHEST PAIN-A-OH

## 2020-12-01 NOTE — PROGRESS NOTES
"SUBJECTIVE: Diana Ferrera is a 40 year old female presenting with a chief complaint of chest pain.  Onset of symptoms was month(s) ago.  Course of illness is waxing and waning.    Severity moderate  Current and Associated symptoms: worsen when laying down or rom  Treatment measures tried include otc.  Predisposing factors include None.    Past Medical History:   Diagnosis Date     Abnormal Pap smear 19, 12     History of colposcopy 1995    12/15/08     Hypertension        Past Surgical History:   Procedure Laterality Date      SECTION  2014    Procedure:  SECTION;   Section;  Surgeon: Diana Lyons MD;  [facility]: [facility]     FRACTURE SURGERY  age 5    factured both wrists roller blading     LEEP TX, CERVICAL  09       Family History   Problem Relation Age of Onset     Diabetes Paternal Grandfather      Diabetes Maternal Grandfather      Diabetes Father      Myocardial Infarction Father        Social History     Tobacco Use     Smoking status: Current Every Day Smoker     Packs/day: 0.50     Years: 15.00     Pack years: 7.50     Last attempt to quit: 2013     Years since quittin.4     Smokeless tobacco: Never Used   Substance Use Topics     Alcohol use: Yes     Comment: before finding out she was pregnant      No Known Allergies    Review Of Systems  Skin: negative  Eyes: negative  Ears/Nose/Throat: negative  Respiratory: No shortness of breath, dyspnea on exertion, cough, or hemoptysis  Cardiovascular: negative  Gastrointestinal: negative  Genitourinary: negative  Musculoskeletal: as above  Neurologic: negative  Psychiatric: negative      OBJECTIVE:  /80 (BP Location: Left arm, Patient Position: Chair, Cuff Size: Adult Regular)   Pulse 95   Temp 98.2  F (36.8  C) (Temporal)   Resp 18   Ht 1.645 m (5' 4.75\")   Wt 81.6 kg (180 lb)   SpO2 99%   BMI 30.19 kg/m  GENERAL APPEARANCE: healthy, alert and no distress  EYES:   " PERRL, conjunctiva clear  HENT: ear canals and TM's normal.  Nose and mouth without ulcers, erythema or lesions  NECK: supple, nontender, no lymphadenopathy  RESP: lungs clear to auscultation - no rales, rhonchi or wheezes  CV: regular rates and rhythm, normal S1 S2, no murmur noted  ABDOMEN:  soft, nontender, no HSM or masses and bowel sounds normal  NEURO: Normal strength and tone, sensory exam grossly normal,  normal speech and mentation  SKIN: no suspicious lesions or rashes  No CVA pain  MS reproducible costochondral pain    Xray without acute findings, no pneumonia/ca read by Roderick Ovalles D.O    EKG NSR without acute st changes.      ICD-10-CM    1. Chest pain, unspecified type  R07.9 Troponin I     D dimer quantitative     CBC with platelets differential     XR Chest 2 Views     naproxen (NAPROSYN) 500 MG tablet     [facility] PT, HAND, AND CHIROPRACTIC REFERRAL       No MI by negative EKG and Troponin  Suspect Costochondral  Rest/ice

## 2020-12-29 ENCOUNTER — OFFICE VISIT (OUTPATIENT)
Dept: OBGYN | Facility: CLINIC | Age: 40
End: 2020-12-29
Payer: COMMERCIAL

## 2020-12-29 VITALS
HEIGHT: 65 IN | DIASTOLIC BLOOD PRESSURE: 72 MMHG | SYSTOLIC BLOOD PRESSURE: 124 MMHG | BODY MASS INDEX: 29.82 KG/M2 | WEIGHT: 179 LBS

## 2020-12-29 DIAGNOSIS — Z30.431 IUD CHECK UP: Primary | ICD-10-CM

## 2020-12-29 PROCEDURE — 99213 OFFICE O/P EST LOW 20 MIN: CPT | Performed by: OBSTETRICS & GYNECOLOGY

## 2020-12-29 ASSESSMENT — MIFFLIN-ST. JEOR: SCORE: 1468.85

## 2020-12-29 NOTE — PROGRESS NOTES
"  SUBJECTIVE:                                                   Diana Ferrera is a 40 year old female who presents to clinic today for the following health issue(s):  Patient presents with:  IUD: string check placed       HPI:  Here for follow up of Mirena IUD. She reports no issues. No spotting, denies fever or chills.       Problem list and histories reviewed & adjusted, as indicated.  Additional history: as documented.    Patient Active Problem List   Diagnosis     fetal abnormality, antepartum     Gestational hypertension     Gestational HTN     H/O LEEP     Past Surgical History:   Procedure Laterality Date      SECTION  2014    Procedure:  SECTION;   Section;  Surgeon: Diana Lyons MD;  [facility]     FRACTURE SURGERY  age 5    factured both wrists roller blading     LEEP TX, CERVICAL  09      Social History     Tobacco Use     Smoking status: Current Every Day Smoker     Packs/day: 0.50     Years: 15.00     Pack years: 7.50     Last attempt to quit: 2013     Years since quittin.5     Smokeless tobacco: Never Used   Substance Use Topics     Alcohol use: Yes     Comment: before finding out she was pregnant      Problem (# of Occurrences) Relation (Name,Age of Onset)    Diabetes (3) Paternal Grandfather, Maternal Grandfather, Father    Myocardial Infarction (1) Father                 levonorgestrel (MIRENA) 20 MCG/24HR IUD, 1 each (20 mcg) by Intrauterine route once       Multiple Vitamin (MULTI-VITAMIN PO), Take 1 tablet by mouth daily       Omega-3 Fatty Acids (FISH OIL) 500 MG CAPS,     No current facility-administered medications on file prior to visit.     No Known Allergies    ROS:  5 point ROS negative except as noted in HPI.    OBJECTIVE:     /72 (BP Location: Right arm, Patient Position: Chair, Cuff Size: Adult Large)   Ht 1.645 m (5' 4.75\")   Wt 81.2 kg (179 lb)   Breastfeeding No   BMI 30.02 kg/m    Body mass index " is 30.02 kg/m .    Exam:  Pelvis: External genitalia, Bartholin, urethral, and Micco glands within normal limits. Urethra is without lesion and nontender to palpation. Bladder is nontender. On speculum exam, cervix is without lesion and vagina is normal without lesion or discharge. Mirena IUD strings are visualized at 3 cm and no other portion of the IUD is seen.      In-Clinic Test Results:  No results found for this or any previous visit (from the past 24 hour(s)).    ASSESSMENT/PLAN:                                                        ICD-10-CM    1. IUD check up  Z30.431          Follow up as needed for routine gyn care.    Edilma Cabello MD  [facility] [place]

## 2020-12-29 NOTE — NURSING NOTE
"Chief Complaint   Patient presents with     IUD     string check placed        Initial /72 (BP Location: Right arm, Patient Position: Chair, Cuff Size: Adult Large)   Ht 1.645 m (5' 4.75\")   Wt 81.2 kg (179 lb)   Breastfeeding No   BMI 30.02 kg/m   Estimated body mass index is 30.02 kg/m  as calculated from the following:    Height as of this encounter: 1.645 m (5' 4.75\").    Weight as of this encounter: 81.2 kg (179 lb).  BP completed using cuff size: large    Questioned patient about current smoking habits.  Pt. has never smoked.          The following HM Due: NONE    Charlotte Williamson CMA    "

## 2021-01-04 ENCOUNTER — APPOINTMENT (OUTPATIENT)
Dept: OBGYN | Facility: CLINIC | Age: 41
End: 2021-01-04
Payer: COMMERCIAL

## 2021-01-04 ASSESSMENT — ANXIETY QUESTIONNAIRES
1. FEELING NERVOUS, ANXIOUS, OR ON EDGE: MORE THAN HALF THE DAYS
2. NOT BEING ABLE TO STOP OR CONTROL WORRYING: SEVERAL DAYS
6. BECOMING EASILY ANNOYED OR IRRITABLE: SEVERAL DAYS
GAD7 TOTAL SCORE: 8
7. FEELING AFRAID AS IF SOMETHING AWFUL MIGHT HAPPEN: NOT AT ALL
5. BEING SO RESTLESS THAT IT IS HARD TO SIT STILL: MORE THAN HALF THE DAYS
GAD7 TOTAL SCORE: 8
7. FEELING AFRAID AS IF SOMETHING AWFUL MIGHT HAPPEN: NOT AT ALL
3. WORRYING TOO MUCH ABOUT DIFFERENT THINGS: SEVERAL DAYS
4. TROUBLE RELAXING: SEVERAL DAYS

## 2021-01-05 ASSESSMENT — ANXIETY QUESTIONNAIRES: GAD7 TOTAL SCORE: 8

## 2021-03-03 ENCOUNTER — OFFICE VISIT (OUTPATIENT)
Dept: FAMILY MEDICINE | Facility: CLINIC | Age: 41
End: 2021-03-03
Payer: COMMERCIAL

## 2021-03-03 VITALS
RESPIRATION RATE: 16 BRPM | DIASTOLIC BLOOD PRESSURE: 88 MMHG | TEMPERATURE: 98.4 F | SYSTOLIC BLOOD PRESSURE: 158 MMHG | BODY MASS INDEX: 29.82 KG/M2 | HEART RATE: 76 BPM | HEIGHT: 65 IN | WEIGHT: 179 LBS | OXYGEN SATURATION: 98 %

## 2021-03-03 DIAGNOSIS — Z83.3 FAMILY HISTORY OF DIABETES MELLITUS: ICD-10-CM

## 2021-03-03 DIAGNOSIS — F41.9 ANXIETY: ICD-10-CM

## 2021-03-03 DIAGNOSIS — R07.9 CHEST PAIN, UNSPECIFIED TYPE: ICD-10-CM

## 2021-03-03 DIAGNOSIS — Z11.59 ENCOUNTER FOR HEPATITIS C SCREENING TEST FOR LOW RISK PATIENT: ICD-10-CM

## 2021-03-03 DIAGNOSIS — R03.0 ELEVATED BLOOD PRESSURE READING WITHOUT DIAGNOSIS OF HYPERTENSION: Primary | ICD-10-CM

## 2021-03-03 DIAGNOSIS — M94.0 COSTOCHONDRITIS: ICD-10-CM

## 2021-03-03 LAB
ALBUMIN UR-MCNC: ABNORMAL MG/DL
AMORPH CRY #/AREA URNS HPF: ABNORMAL /HPF
APPEARANCE UR: CLEAR
BACTERIA #/AREA URNS HPF: ABNORMAL /HPF
BILIRUB UR QL STRIP: NEGATIVE
COLOR UR AUTO: YELLOW
ERYTHROCYTE [SEDIMENTATION RATE] IN BLOOD BY WESTERGREN METHOD: 9 MM/H (ref 0–20)
GLUCOSE UR STRIP-MCNC: NEGATIVE MG/DL
HBA1C MFR BLD: 5.2 % (ref 0–5.6)
HCV AB SERPL QL IA: NONREACTIVE
HGB UR QL STRIP: NEGATIVE
KETONES UR STRIP-MCNC: NEGATIVE MG/DL
LEUKOCYTE ESTERASE UR QL STRIP: NEGATIVE
NITRATE UR QL: NEGATIVE
NON-SQ EPI CELLS #/AREA URNS LPF: ABNORMAL /LPF
PH UR STRIP: 7.5 PH (ref 5–7)
RBC #/AREA URNS AUTO: ABNORMAL /HPF
SOURCE: ABNORMAL
SP GR UR STRIP: 1.02 (ref 1–1.03)
UROBILINOGEN UR STRIP-ACNC: 1 EU/DL (ref 0.2–1)
WBC #/AREA URNS AUTO: ABNORMAL /HPF

## 2021-03-03 PROCEDURE — 85652 RBC SED RATE AUTOMATED: CPT | Performed by: FAMILY MEDICINE

## 2021-03-03 PROCEDURE — 84443 ASSAY THYROID STIM HORMONE: CPT | Performed by: FAMILY MEDICINE

## 2021-03-03 PROCEDURE — 86803 HEPATITIS C AB TEST: CPT | Performed by: FAMILY MEDICINE

## 2021-03-03 PROCEDURE — 81001 URINALYSIS AUTO W/SCOPE: CPT | Performed by: FAMILY MEDICINE

## 2021-03-03 PROCEDURE — 99214 OFFICE O/P EST MOD 30 MIN: CPT | Performed by: FAMILY MEDICINE

## 2021-03-03 PROCEDURE — 83036 HEMOGLOBIN GLYCOSYLATED A1C: CPT | Performed by: FAMILY MEDICINE

## 2021-03-03 PROCEDURE — 80061 LIPID PANEL: CPT | Performed by: FAMILY MEDICINE

## 2021-03-03 PROCEDURE — 36415 COLL VENOUS BLD VENIPUNCTURE: CPT | Performed by: FAMILY MEDICINE

## 2021-03-03 PROCEDURE — 80053 COMPREHEN METABOLIC PANEL: CPT | Performed by: FAMILY MEDICINE

## 2021-03-03 RX ORDER — NAPROXEN 500 MG/1
500 TABLET ORAL 2 TIMES DAILY WITH MEALS
Qty: 60 TABLET | Refills: 1 | Status: SHIPPED | OUTPATIENT
Start: 2021-03-03 | End: 2021-05-03

## 2021-03-03 RX ORDER — CYCLOBENZAPRINE HCL 5 MG
5 TABLET ORAL 2 TIMES DAILY PRN
Qty: 30 TABLET | Refills: 1 | Status: SHIPPED | OUTPATIENT
Start: 2021-03-03 | End: 2021-11-10

## 2021-03-03 SDOH — HEALTH STABILITY: MENTAL HEALTH: HOW MANY STANDARD DRINKS CONTAINING ALCOHOL DO YOU HAVE ON A TYPICAL DAY?: 1 OR 2

## 2021-03-03 SDOH — HEALTH STABILITY: MENTAL HEALTH: HOW OFTEN DO YOU HAVE A DRINK CONTAINING ALCOHOL?: 4 OR MORE TIMES A WEEK

## 2021-03-03 SDOH — ECONOMIC STABILITY: TRANSPORTATION INSECURITY
IN THE PAST 12 MONTHS, HAS THE LACK OF TRANSPORTATION KEPT YOU FROM MEDICAL APPOINTMENTS OR FROM GETTING MEDICATIONS?: NOT ASKED

## 2021-03-03 SDOH — ECONOMIC STABILITY: FOOD INSECURITY: WITHIN THE PAST 12 MONTHS, THE FOOD YOU BOUGHT JUST DIDN'T LAST AND YOU DIDN'T HAVE MONEY TO GET MORE.: NOT ASKED

## 2021-03-03 SDOH — ECONOMIC STABILITY: TRANSPORTATION INSECURITY
IN THE PAST 12 MONTHS, HAS LACK OF TRANSPORTATION KEPT YOU FROM MEETINGS, WORK, OR FROM GETTING THINGS NEEDED FOR DAILY LIVING?: NOT ASKED

## 2021-03-03 SDOH — ECONOMIC STABILITY: FOOD INSECURITY: WITHIN THE PAST 12 MONTHS, YOU WORRIED THAT YOUR FOOD WOULD RUN OUT BEFORE YOU GOT MONEY TO BUY MORE.: NOT ASKED

## 2021-03-03 SDOH — ECONOMIC STABILITY: INCOME INSECURITY: HOW HARD IS IT FOR YOU TO PAY FOR THE VERY BASICS LIKE FOOD, HOUSING, MEDICAL CARE, AND HEATING?: NOT ASKED

## 2021-03-03 SDOH — HEALTH STABILITY: MENTAL HEALTH: HOW OFTEN DO YOU HAVE 6 OR MORE DRINKS ON ONE OCCASION?: WEEKLY

## 2021-03-03 ASSESSMENT — ANXIETY QUESTIONNAIRES
IF YOU CHECKED OFF ANY PROBLEMS ON THIS QUESTIONNAIRE, HOW DIFFICULT HAVE THESE PROBLEMS MADE IT FOR YOU TO DO YOUR WORK, TAKE CARE OF THINGS AT HOME, OR GET ALONG WITH OTHER PEOPLE: SOMEWHAT DIFFICULT
GAD7 TOTAL SCORE: 9
6. BECOMING EASILY ANNOYED OR IRRITABLE: SEVERAL DAYS
2. NOT BEING ABLE TO STOP OR CONTROL WORRYING: SEVERAL DAYS
7. FEELING AFRAID AS IF SOMETHING AWFUL MIGHT HAPPEN: NOT AT ALL
3. WORRYING TOO MUCH ABOUT DIFFERENT THINGS: NEARLY EVERY DAY
5. BEING SO RESTLESS THAT IT IS HARD TO SIT STILL: NOT AT ALL
1. FEELING NERVOUS, ANXIOUS, OR ON EDGE: SEVERAL DAYS

## 2021-03-03 ASSESSMENT — PATIENT HEALTH QUESTIONNAIRE - PHQ9
SUM OF ALL RESPONSES TO PHQ QUESTIONS 1-9: 6
5. POOR APPETITE OR OVEREATING: NEARLY EVERY DAY

## 2021-03-03 ASSESSMENT — MIFFLIN-ST. JEOR: SCORE: 1459.88

## 2021-03-03 NOTE — PATIENT INSTRUCTIONS
Patient Education     Chest Wall Pain: Costochondritis    The chest pain that you have had today is caused by costochondritis. This condition is caused by an inflammation of the cartilage joining your ribs to your breastbone. It's not caused by heart or lung problems. Your healthcare team has made sure that the chest pain you feel is not from a life threatening cause of chest pain such as heart attack, collapsed lung, blood clot in the lung, tear in the aorta, or esophageal rupture. The inflammation may have been brought on by a blow to the chest, lifting heavy objects, intense exercise, or an illness that made you cough and sneeze a lot. It often occurs during times of emotional stress. It can be painful, but it's not dangerous. It usually goes away in 1 to 2 weeks. But it may happen again. Rarely, a more serious condition may cause symptoms similar to costochondritis. That s why it s important to watch for the warning signs listed below.   Home care  Follow these guidelines when caring for yourself at home:    If you feel that emotional stress is a cause of your condition, try to figure out the sources of that stress. It may not be obvious. Learn ways to deal with the stress in your life. This can include regular exercise, muscle relaxation, meditation, or simply taking time out for yourself.    You may use acetaminophen, ibuprofen, or naproxen to control pain, unless another pain medicine was prescribed. If you have liver or kidney disease or ever had a stomach ulcer, talk with your healthcare provider before using these medicines.    You can also help ease pain by using a hot, wet compress or heating pad. Use this with or without a medicated skin cream that helps relieves pain.    Do stretching exercise as advised by your provider. Typically rest is beneficial for the first few days. Avoid strenuous activity that worsens the pain.    Take any prescribed medicines as directed.  Follow-up care  Follow up with  your healthcare provider, or as advised.   When to seek medical advice  Call your healthcare provider right away if any of these occur:    A change in the type of pain. Call if it feels different, becomes more serious, lasts longer, or spreads into your shoulder, arm, neck, jaw, or back.    Shortness of breath or pain gets worse when you breathe    Weakness, dizziness, or fainting    Cough with dark-colored sputum (phlegm) or blood    Abdominal pain    Dark red or black stools    Fever of 100.4 F (38 C) or higher, or as directed by your healthcare provider  Sola last reviewed this educational content on 6/1/2019 2000-2020 [employer]. All rights reserved. This information is not intended as a substitute for professional medical care. Always follow your healthcare professional's instructions.

## 2021-03-03 NOTE — PROGRESS NOTES
"    Assessment & Plan     Elevated blood pressure reading without diagnosis of hypertension  Recheck in one month    - Comprehensive metabolic panel (BMP + Alb, Alk Phos, ALT, AST, Total. Bili, TP)  - Lipid panel reflex to direct LDL Fasting  - *UA reflex to Microscopic  - TSH with free T4 reflex    Chest pain, unspecified type  likely costochondritis  Handout on the above diagnosis was given to the patient and discussed    - Comprehensive metabolic panel (BMP + Alb, Alk Phos, ALT, AST, Total. Bili, TP)  - ESR: Erythrocyte sedimentation rate  - Lipid panel reflex to direct LDL Fasting    Family history of diabetes mellitus    - Hemoglobin A1c  - REVIEW OF HEALTH MAINTENANCE PROTOCOL ORDERS    Encounter for hepatitis C screening test for low risk patient    - Hepatitis C Screen Reflex to HCV RNA Quant and Genotype    Costochondritis  Handout on the above diagnosis was given to the patient and discussed    - naproxen (NAPROSYN) 500 MG tablet; Take 1 tablet (500 mg) by mouth 2 times daily (with meals)  - cyclobenzaprine (FLEXERIL) 5 MG tablet; Take 1 tablet (5 mg) by mouth 2 times daily as needed for muscle spasms  The potential side effects of the prescription medication were discussed with the patient.    Anxiety  Will start  - sertraline (ZOLOFT) 50 MG tablet; 1/2 tab orally for 8 days and then one tab per day  The potential side effects of the prescription medication were discussed with the patient.  Recheck with virtual visit in 2 months      25 minutes spent on the date of the encounter doing chart review, review of test results, interpretation of tests, patient visit and documentation        BMI:   Estimated body mass index is 30.25 kg/m  as calculated from the following:    Height as of this encounter: 1.638 m (5' 4.5\").    Weight as of this encounter: 81.2 kg (179 lb).   Weight management plan: Discussed healthy diet and exercise guidelines         - Make appointment with nurse to check blood pressure in 4 " weeks    Return in about 4 weeks (around 3/31/2021) for nurse only BP check and in 2 months for virtual visit sertraline.    Judit Olivares MD  [facility]    Dana Ortiz is a 41 year old who presents for the following health issues .  She has had chest pain/pressure for about a year.    When lying down and moving she can feel it.   It is sternal and lower ribs.  Pushing can make it worse.    She wonders about anxiety.    She would like to establish primary care here.   She does see OB/GYN and has been followed for abnormal paps and LEEP years ago.      She is also wondering about anxiety the last year.   She has been on sertraline in the past for depression   It works well but it has been about 7 years since she has been on it.       Also wondering about blood pressure .   Her dad and sister have had some issues.   She has noted a few times in the last few years where it has been mentioned that it is up.     History of Present Illness       She eats 0-1 servings of fruits and vegetables daily.She consumes 4 sweetened beverage(s) daily.She exercises with enough effort to increase her heart rate 10 to 19 minutes per day.  She exercises with enough effort to increase her heart rate 3 or less days per week.   She is taking medications regularly.         Chest Pain  Onset/Duration: x yr  Description:   Location: Mid chest  Character: achey and tight  Radiation: epigastrium  Duration: constant   Intensity: mild  Progression of Symptoms: constant  Accompanying Signs & Symptoms:  Shortness of breath: no  Sweating: no-thinks starting menopause  Nausea/vomiting: no  Lightheadedness: no-some headaches, not constant  Palpitations: no  Fever/Chills: no  Cough: no           Heartburn: no -tried prilosec and nothing changed  History:   Family history of heart disease: YES- father  Tobacco use: YES  Previous similar symptoms: YES  Precipitating factors:   Worse with exertion: no  Worse with deep  breaths: no           Related to eating: no           Better with burping: no  Alleviating factors: none  Therapies tried and outcome: none    Abnormal Mood Symptoms  Onset/Duration: x yr  Description: tight chest, with chest pain  Depression (if yes, do PHQ-9): no  Anxiety (if yes, do JACKY-7): YES  Accompanying Signs & Symptoms:  Still participating in activities that you used to enjoy: no-more because of COVID  Fatigue: YES-insomnia sx worse  Irritability: YES  Difficulty concentrating: no  Changes in appetite: no  Problems with sleep: YES  Heart racing/beating fast: no  Abnormally elevated, expansive, or irritable mood: YES  Persistently increased activity or energy: no  Thoughts of hurting yourself or others: no  History:  Recent stress or major life event: YES- with COVID, school with kids cancelled, stopped working (will be starting up)  Prior depression or anxiety: yes  Family history of depression or anxiety: YES  Alcohol/drug use: YES- alcohol  Difficulty sleeping: YES  Precipitating or alleviating factors: None  Therapies tried and outcome: none  No flowsheet data found.  JACKY-7 SCORE 2021   Total Score 8 (mild anxiety)   Total Score 8     Past Medical History:   Diagnosis Date     Abnormal Pap smear 19, 12     History of colposcopy 1995    12/15/08     Hypertension        Past Surgical History:   Procedure Laterality Date      SECTION  2014    Procedure:  SECTION;   Section;  Surgeon: Diana Lyons MD;  [facility]     FRACTURE SURGERY  age 5    factured both wrists roller blading     INSERT INTRAUTERINE DEVICE  2020    Mirena - also had one in [id number]     LEEP TX, CERVICAL  09       MEDICATIONS:  Current Outpatient Medications   Medication     cyclobenzaprine (FLEXERIL) 5 MG tablet     levonorgestrel (MIRENA) 20 MCG/24HR IUD     Multiple Vitamin (MULTI-VITAMIN PO)     naproxen (NAPROSYN) 500 MG tablet     Omega-3  "Fatty Acids (FISH OIL) 500 MG CAPS     sertraline (ZOLOFT) 50 MG tablet     No current facility-administered medications for this visit.        SOCIAL HISTORY:  Social History     Tobacco Use     Smoking status: Current Every Day Smoker     Packs/day: 0.50     Years: 25.00     Pack years: 12.50     Start date: 1/1/1994     Smokeless tobacco: Never Used     Tobacco comment: years of quitting when pregnant   Substance Use Topics     Alcohol use: Yes     Frequency: 4 or more times a week     Drinks per session: 1 or 2     Binge frequency: Weekly     Comment: before finding out she was pregnant       Family History   Problem Relation Age of Onset     Diabetes Paternal Grandfather      Diabetes Maternal Grandfather      Diabetes Father      Myocardial Infarction Father      Hypertension Father          Review of Systems   Constitutional, HEENT, cardiovascular, pulmonary, gi and gu systems are negative, except as otherwise noted.      Objective    BP (!) 150/100 (BP Location: Right arm, Patient Position: Chair, Cuff Size: Adult Regular)   Pulse 76   Temp 98.4  F (36.9  C) (Oral)   Resp 16   Ht 1.638 m (5' 4.5\")   Wt 81.2 kg (179 lb)   SpO2 98%   BMI 30.25 kg/m    Body mass index is 30.25 kg/m .  Physical Exam   GENERAL: healthy, alert and no distress  EYES: Eyes grossly normal to inspection, PERRL and conjunctivae and sclerae normal  HENT: ear canals and TM's normal, nose and mouth without ulcers or lesions  NECK: no adenopathy, no asymmetry, masses, or scars and thyroid normal to palpation  RESP: lungs clear to auscultation - no rales, rhonchi or wheezes  CV: regular rate and rhythm, normal S1 S2, no S3 or S4, no murmur, click or rub, no peripheral edema and peripheral pulses strong  MS: no gross musculoskeletal defects noted, no edema  SKIN: no suspicious lesions or rashes  NEURO: Normal strength and tone, mentation intact and speech normal  PSYCH: mentation appears normal, affect normal/bright  LYMPH: no " cervical, supraclavicular, axillary, or inguinal adenopathy  Chest: some tenderness along the sides of the sternum - left greater than right.   No redness or swelling noted    Office Visit on 11/30/2020   Component Date Value Ref Range Status     Troponin I ES 11/30/2020 <0.015  0.000 - 0.045 ug/L Final    Comment: The 99th percentile for upper reference range is 0.045 ug/L.  Troponin values   in the range of 0.045 - 0.120 ug/L may be associated with risks of adverse   clinical events.       D Dimer 11/30/2020 0.4  0.0 - 0.50 ug/ml FEU Final    Comment: This D-dimer assay is intended for use in conjunction with a clinical pretest   probability assessment model to exclude pulmonary embolism (PE) and deep   venous thrombosis (DVT) in outpatients suspected of PE or DVT. The cut-off   value is 0.5 ug/mL FEU.       WBC 11/30/2020 9.2  4.0 - 11.0 10e9/L Final     RBC Count 11/30/2020 4.81  3.8 - 5.2 10e12/L Final     Hemoglobin 11/30/2020 15.5  11.7 - 15.7 g/dL Final     Hematocrit 11/30/2020 46.6  35.0 - 47.0 % Final     MCV 11/30/2020 97  78 - 100 fl Final     MCH 11/30/2020 32.2  26.5 - 33.0 pg Final     MCHC 11/30/2020 33.3  31.5 - 36.5 g/dL Final     RDW 11/30/2020 12.7  10.0 - 15.0 % Final     Platelet Count 11/30/2020 228  150 - 450 10e9/L Final     % Neutrophils 11/30/2020 62.0  % Final     % Lymphocytes 11/30/2020 29.6  % Final     % Monocytes 11/30/2020 6.6  % Final     % Eosinophils 11/30/2020 1.5  % Final     % Basophils 11/30/2020 0.3  % Final     Absolute Neutrophil 11/30/2020 5.7  1.6 - 8.3 10e9/L Final     Absolute Lymphocytes 11/30/2020 2.7  0.8 - 5.3 10e9/L Final     Absolute Monocytes 11/30/2020 0.6  0.0 - 1.3 10e9/L Final     Absolute Eosinophils 11/30/2020 0.1  0.0 - 0.7 10e9/L Final     Absolute Basophils 11/30/2020 0.0  0.0 - 0.2 10e9/L Final     Diff Method 11/30/2020 Automated Method   Final     cxr reviewed from UC and it was good  ekg reviewed from UC and it was good              Answers for  HPI/ROS submitted by the patient on 3/2/2021   Chronic problems general questions HPI Form  How many servings of fruits and vegetables do you eat daily?: 0-1  On average, how many sweetened beverages do you drink each day (Examples: soda, juice, sweet tea, etc.  Do NOT count diet or artificially sweetened beverages)?: 4  How many minutes a day do you exercise enough to make your heart beat faster?: 10 to 19  How many days a week do you exercise enough to make your heart beat faster?: 3 or less  How many days per week do you miss taking your medication?: 0

## 2021-03-04 LAB
ALBUMIN SERPL-MCNC: 3.6 G/DL (ref 3.4–5)
ALP SERPL-CCNC: 60 U/L (ref 40–150)
ALT SERPL W P-5'-P-CCNC: 28 U/L (ref 0–50)
ANION GAP SERPL CALCULATED.3IONS-SCNC: 5 MMOL/L (ref 3–14)
AST SERPL W P-5'-P-CCNC: 19 U/L (ref 0–45)
BILIRUB SERPL-MCNC: 0.6 MG/DL (ref 0.2–1.3)
BUN SERPL-MCNC: 13 MG/DL (ref 7–30)
CALCIUM SERPL-MCNC: 9.4 MG/DL (ref 8.5–10.1)
CHLORIDE SERPL-SCNC: 106 MMOL/L (ref 94–109)
CHOLEST SERPL-MCNC: 233 MG/DL
CO2 SERPL-SCNC: 27 MMOL/L (ref 20–32)
CREAT SERPL-MCNC: 0.76 MG/DL (ref 0.52–1.04)
GFR SERPL CREATININE-BSD FRML MDRD: >90 ML/MIN/{1.73_M2}
GLUCOSE SERPL-MCNC: 94 MG/DL (ref 70–99)
HDLC SERPL-MCNC: 67 MG/DL
LDLC SERPL CALC-MCNC: 146 MG/DL
NONHDLC SERPL-MCNC: 166 MG/DL
POTASSIUM SERPL-SCNC: 4.4 MMOL/L (ref 3.4–5.3)
PROT SERPL-MCNC: 7.4 G/DL (ref 6.8–8.8)
SODIUM SERPL-SCNC: 138 MMOL/L (ref 133–144)
TRIGL SERPL-MCNC: 102 MG/DL
TSH SERPL DL<=0.005 MIU/L-ACNC: 1.44 MU/L (ref 0.4–4)

## 2021-03-04 ASSESSMENT — ANXIETY QUESTIONNAIRES: GAD7 TOTAL SCORE: 9

## 2021-03-31 ENCOUNTER — ALLIED HEALTH/NURSE VISIT (OUTPATIENT)
Dept: FAMILY MEDICINE | Facility: CLINIC | Age: 41
End: 2021-03-31
Payer: COMMERCIAL

## 2021-03-31 ENCOUNTER — TELEPHONE (OUTPATIENT)
Dept: FAMILY MEDICINE | Facility: CLINIC | Age: 41
End: 2021-03-31

## 2021-03-31 VITALS — SYSTOLIC BLOOD PRESSURE: 142 MMHG | DIASTOLIC BLOOD PRESSURE: 82 MMHG

## 2021-03-31 DIAGNOSIS — R03.0 ELEVATED BLOOD PRESSURE READING WITHOUT DIAGNOSIS OF HYPERTENSION: Primary | ICD-10-CM

## 2021-03-31 PROCEDURE — 99207 PR NO CHARGE NURSE ONLY: CPT

## 2021-03-31 NOTE — PROGRESS NOTES
Diana Ferrera is a 41 year old year old patient who comes in today for a Blood Pressure check because of ongoing blood pressure monitoring.  Vital Signs as repeated by /88  Patient is not taking medication as prescribed  Patient is not prescribed medication  Patient is not monitoring Blood Pressure at home.  Current complaints: headaches  Disposition:  Pt is a/o denies HA at this time.  Pt spoke about her job that is high stress.  She spoke of a lot of travel with her job. States strong family hx of heart problems.    Will notify provider pt does have a provider appt at the end of the month.    Graciela MARTINEZ RN, BSN, PAL (Patient Advocate Liaison)  [facility]   [phone]

## 2021-03-31 NOTE — TELEPHONE ENCOUNTER
Pt was in clinic this am for a BP check.    Pt's BP was  150/80 upon arrival  142/82 after 15 min  142/88 at RN visit    Pt c/o occasional HA.  Denies dizziness, chest/neck/arm pain.   Pt is very active in her job and it is stressful.  Pt travel often via plane w/o COVID vacc which is very stressful to her.    Pt states a family hx of HTN and cardiac problems.    BP Readings from Last 6 Encounters:   03/31/21 (!) 142/82   03/03/21 (!) 158/88   12/29/20 124/72   11/30/20 114/80   11/05/20 126/78   09/25/20 (!) 138/92     Graciela MARTINEZ RN, BSN, PAL (Patient Advocate Liaison)  [facility]   [phone]

## 2021-04-02 RX ORDER — AMLODIPINE BESYLATE 2.5 MG/1
2.5 TABLET ORAL DAILY
Qty: 30 TABLET | Refills: 3 | Status: SHIPPED | OUTPATIENT
Start: 2021-04-02 | End: 2021-05-05

## 2021-04-02 NOTE — TELEPHONE ENCOUNTER
LM on  to call this RN back if he should call the main clinic please forward to me [phone]  Graciela Solorzano RN

## 2021-04-02 NOTE — TELEPHONE ENCOUNTER
I have cued up medication.   I would like her to start this and recheck in 1 month with you for blood pressure recheck.   Please check on pharmacy

## 2021-04-02 NOTE — TELEPHONE ENCOUNTER
Pt called back, gave her info and set up next BP appt.  Also signed medication written by Judit Olivares MD with correct pharmacy    Graciela MARTINEZ RN, BSN, PAL (Patient Advocate Liaison)  [facility]   [phone]

## 2021-04-30 DIAGNOSIS — F41.9 ANXIETY: ICD-10-CM

## 2021-04-30 DIAGNOSIS — M94.0 COSTOCHONDRITIS: ICD-10-CM

## 2021-04-30 NOTE — TELEPHONE ENCOUNTER
Zoloft: Medication refilled per [facility] [place]    Naproxen: Routing refill request to provider for review/approval because:  Drug not active on patient's medication list      Valorie Camarena RN

## 2021-05-03 ENCOUNTER — ALLIED HEALTH/NURSE VISIT (OUTPATIENT)
Dept: FAMILY MEDICINE | Facility: CLINIC | Age: 41
End: 2021-05-03
Payer: COMMERCIAL

## 2021-05-03 VITALS — SYSTOLIC BLOOD PRESSURE: 124 MMHG | DIASTOLIC BLOOD PRESSURE: 80 MMHG

## 2021-05-03 DIAGNOSIS — Z01.30 BLOOD PRESSURE CHECK: Primary | ICD-10-CM

## 2021-05-03 PROCEDURE — 99207 PR NO CHARGE NURSE ONLY: CPT

## 2021-05-03 RX ORDER — NAPROXEN 500 MG/1
TABLET ORAL
Qty: 60 TABLET | Refills: 1 | Status: SHIPPED | OUTPATIENT
Start: 2021-05-03 | End: 2021-11-10

## 2021-05-03 NOTE — PROGRESS NOTES
Diana Ferrera is a 41 year old patient who comes in today for a Blood Pressure check.  Initial BP:  /80 (BP Location: Left arm, Patient Position: Chair, Cuff Size: Adult Regular)      Data Unavailable  Disposition: follow-up as previously indicated by provider

## 2021-05-05 ENCOUNTER — VIRTUAL VISIT (OUTPATIENT)
Dept: FAMILY MEDICINE | Facility: CLINIC | Age: 41
End: 2021-05-05
Payer: COMMERCIAL

## 2021-05-05 DIAGNOSIS — I10 BENIGN ESSENTIAL HYPERTENSION: ICD-10-CM

## 2021-05-05 DIAGNOSIS — M94.0 COSTOCHONDRITIS: Primary | ICD-10-CM

## 2021-05-05 DIAGNOSIS — F41.9 ANXIETY: ICD-10-CM

## 2021-05-05 PROCEDURE — 99213 OFFICE O/P EST LOW 20 MIN: CPT | Mod: GT | Performed by: FAMILY MEDICINE

## 2021-05-05 RX ORDER — AMLODIPINE BESYLATE 2.5 MG/1
2.5 TABLET ORAL DAILY
Qty: 90 TABLET | Refills: 1 | Status: SHIPPED | OUTPATIENT
Start: 2021-05-05 | End: 2021-12-08

## 2021-05-05 ASSESSMENT — ANXIETY QUESTIONNAIRES
GAD7 TOTAL SCORE: 1
6. BECOMING EASILY ANNOYED OR IRRITABLE: NOT AT ALL
5. BEING SO RESTLESS THAT IT IS HARD TO SIT STILL: NOT AT ALL
1. FEELING NERVOUS, ANXIOUS, OR ON EDGE: NOT AT ALL
IF YOU CHECKED OFF ANY PROBLEMS ON THIS QUESTIONNAIRE, HOW DIFFICULT HAVE THESE PROBLEMS MADE IT FOR YOU TO DO YOUR WORK, TAKE CARE OF THINGS AT HOME, OR GET ALONG WITH OTHER PEOPLE: NOT DIFFICULT AT ALL
7. FEELING AFRAID AS IF SOMETHING AWFUL MIGHT HAPPEN: NOT AT ALL
2. NOT BEING ABLE TO STOP OR CONTROL WORRYING: NOT AT ALL
3. WORRYING TOO MUCH ABOUT DIFFERENT THINGS: NOT AT ALL

## 2021-05-05 ASSESSMENT — PATIENT HEALTH QUESTIONNAIRE - PHQ9
5. POOR APPETITE OR OVEREATING: SEVERAL DAYS
SUM OF ALL RESPONSES TO PHQ QUESTIONS 1-9: 1

## 2021-05-05 NOTE — PROGRESS NOTES
Diana is a 41 year old who is being evaluated via a billable video visit.      How would you like to obtain your AVS? MyChart  If the video visit is dropped, the invitation should be resent by: Text to cell phone: [phone]  Will anyone else be joining your video visit? No    Video Start Time: 8:04 AM    Assessment & Plan     Anxiety  Under very good control JACKY from 9 to 1  Refills per epicare  continue  - sertraline (ZOLOFT) 50 MG tablet; ONE TABLET BY MOUTH DAILY    Costochondritis  Resolved but perhaps in lower rib area now - mild but will let us know if this gets worse    Benign essential hypertension  under good control  Continue medication  Refills per epicare    - amLODIPine (NORVASC) 2.5 MG tablet; Take 1 tablet (2.5 mg) by mouth daily      20 minutes spent on the date of the encounter doing chart review, review of test results, patient visit and documentation        MEDICATIONS:  Continue current medications without change    No follow-ups on file.    Judit Olivares MD  [facility]   Diana is a 41 year old who presents for the following health issues - she is here to recheck on her mood.   She had blood pressure recheck with nurse and it was really good.   It was 124/80.   She also has noted that the chest pain at her last visit is now gone.   She does feel an ache now in her lower ribs for the last few weeks.   It is worse when she lays down to sleep.    It does not cause shortness of breath and is not worse with cough or deep breaths.       She started sertraline about 6-7 weeks ago and after 3 weeks started to note some improvement.    She feels a lot better now with her symptoms and her sleep.   She sleeps more and wakes up during the night less.   She feels there is less stress at work too.   She is not having any side effects on her medications.       HPI     Anxiety Follow-Up    How are you doing with your anxiety since your last visit? Improved      Are you having other symptoms that might be associated with anxiety? No    Have you had a significant life event? No     Are you feeling depressed? No    Do you have any concerns with your use of alcohol or other drugs? No    Social History     Tobacco Use     Smoking status: Current Every Day Smoker     Packs/day: 0.50     Years: 25.00     Pack years: 12.50     Start date: 1/1/1994     Smokeless tobacco: Never Used     Tobacco comment: years of quitting when pregnant   Substance Use Topics     Alcohol use: Yes     Frequency: 4 or more times a week     Drinks per session: 1 or 2     Binge frequency: Weekly     Comment: before finding out she was pregnant     Drug use: No     JACKY-7 SCORE 1/4/2021 3/3/2021   Total Score 8 (mild anxiety) -   Total Score 8 9     PHQ 3/3/2021 3/3/2021   PHQ-9 Total Score 6 6   Q9: Thoughts of better off dead/self-harm past 2 weeks Not at all Not at all     Last PHQ-9 5/5/2021   1.  Little interest or pleasure in doing things 0   2.  Feeling down, depressed, or hopeless 0   3.  Trouble falling or staying asleep, or sleeping too much 1   4.  Feeling tired or having little energy 0   5.  Poor appetite or overeating 0   6.  Feeling bad about yourself 0   7.  Trouble concentrating 0   8.  Moving slowly or restless 0   Q9: Thoughts of better off dead/self-harm past 2 weeks 0   PHQ-9 Total Score 1   Difficulty at work, home, or with people Not difficult at all     JACKY-7  5/5/2021   1. Feeling nervous, anxious, or on edge 0   2. Not being able to stop or control worrying 0   3. Worrying too much about different things 0   4. Trouble relaxing 1   5. Being so restless that it is hard to sit still 0   6. Becoming easily annoyed or irritable 0   7. Feeling afraid, as if something awful might happen 0   JACKY-7 Total Score 1   If you checked any problems, how difficult have they made it for you to do your work, take care of things at home, or get along with other people? Not difficult at all          How many servings of fruits and vegetables do you eat daily?  0-1    On average, how many sweetened beverages do you drink each day (Examples: soda, juice, sweet tea, etc.  Do NOT count diet or artificially sweetened beverages)?   1-2    How many days per week do you exercise enough to make your heart beat faster? 3 or less    How many minutes a day do you exercise enough to make your heart beat faster? 30 - 60    How many days per week do you miss taking your medication? 0    Past Medical History:   Diagnosis Date     Abnormal Pap smear 19, 12     History of colposcopy 1995    12/15/08     Hypertension        Past Surgical History:   Procedure Laterality Date      SECTION  2014    Procedure:  SECTION;   Section;  Surgeon: Diana Lyons MD;  Location: [facility]     FRACTURE SURGERY  age 5    factured both wrists roller blading     INSERT INTRAUTERINE DEVICE  2020    Mirena - also had one in [id number]     LEEP TX, CERVICAL  09       MEDICATIONS:  Current Outpatient Medications   Medication     amLODIPine (NORVASC) 2.5 MG tablet     cyclobenzaprine (FLEXERIL) 5 MG tablet     levonorgestrel (MIRENA) 20 MCG/24HR IUD     Multiple Vitamin (MULTI-VITAMIN PO)     naproxen (NAPROSYN) 500 MG tablet     Omega-3 Fatty Acids (FISH OIL) 500 MG CAPS     sertraline (ZOLOFT) 50 MG tablet     No current facility-administered medications for this visit.        SOCIAL HISTORY:  Social History     Tobacco Use     Smoking status: Current Every Day Smoker     Packs/day: 0.50     Years: 25.00     Pack years: 12.50     Start date: 1994     Smokeless tobacco: Never Used     Tobacco comment: years of quitting when pregnant   Substance Use Topics     Alcohol use: Yes     Frequency: 4 or more times a week     Drinks per session: 1 or 2     Binge frequency: Weekly     Comment: before finding out she was pregnant       Family History   Problem Relation Age  of Onset     Diabetes Paternal Grandfather      Diabetes Maternal Grandfather      Diabetes Father      Myocardial Infarction Father      Hypertension Father            Review of Systems   Constitutional, HEENT, cardiovascular, pulmonary, gi and gu systems are negative, except as otherwise noted.      Objective           Vitals:  No vitals were obtained today due to virtual visit.    Physical Exam   GENERAL: Healthy, alert and no distress  EYES: Eyes grossly normal to inspection.  No discharge or erythema, or obvious scleral/conjunctival abnormalities.  RESP: No audible wheeze, cough, or visible cyanosis.  No visible retractions or increased work of breathing.    SKIN: Visible skin clear. No significant rash, abnormal pigmentation or lesions.  NEURO: Cranial nerves grossly intact.  Mentation and speech appropriate for age.  PSYCH: Mentation appears normal, affect normal/bright, judgement and insight intact, normal speech and appearance well-groomed.    Office Visit on 03/03/2021   Component Date Value Ref Range Status     Sodium 03/03/2021 138  133 - 144 mmol/L Final     Potassium 03/03/2021 4.4  3.4 - 5.3 mmol/L Final     Chloride 03/03/2021 106  94 - 109 mmol/L Final     Carbon Dioxide 03/03/2021 27  20 - 32 mmol/L Final     Anion Gap 03/03/2021 5  3 - 14 mmol/L Final     Glucose 03/03/2021 94  70 - 99 mg/dL Final     Urea Nitrogen 03/03/2021 13  7 - 30 mg/dL Final     Creatinine 03/03/2021 0.76  0.52 - 1.04 mg/dL Final     GFR Estimate 03/03/2021 >90  >60 mL/min/[1.73_m2] Final    Comment: Non  GFR Calc  Starting 12/18/2018, serum creatinine based estimated GFR (eGFR) will be   calculated using the Chronic Kidney Disease Epidemiology Collaboration   (CKD-EPI) equation.       GFR Estimate If Black 03/03/2021 >90  >60 mL/min/[1.73_m2] Final    Comment:  GFR Calc  Starting 12/18/2018, serum creatinine based estimated GFR (eGFR) will be   calculated using the Chronic Kidney Disease  Epidemiology Collaboration   (CKD-EPI) equation.       Calcium 03/03/2021 9.4  8.5 - 10.1 mg/dL Final     Bilirubin Total 03/03/2021 0.6  0.2 - 1.3 mg/dL Final     Albumin 03/03/2021 3.6  3.4 - 5.0 g/dL Final     Protein Total 03/03/2021 7.4  6.8 - 8.8 g/dL Final     Alkaline Phosphatase 03/03/2021 60  40 - 150 U/L Final     ALT 03/03/2021 28  0 - 50 U/L Final     AST 03/03/2021 19  0 - 45 U/L Final     Sed Rate 03/03/2021 9  0 - 20 mm/h Final     Hemoglobin A1C 03/03/2021 5.2  0 - 5.6 % Final    Comment: Normal <5.7% Prediabetes 5.7-6.4%  Diabetes 6.5% or higher - adopted from ADA   consensus guidelines.       Cholesterol 03/03/2021 233* <200 mg/dL Final    Desirable:       <200 mg/dl     Triglycerides 03/03/2021 102  <150 mg/dL Final     HDL Cholesterol 03/03/2021 67  >49 mg/dL Final     LDL Cholesterol Calculated 03/03/2021 146* <100 mg/dL Final    Comment: Above desirable:  100-129 mg/dl  Borderline High:  130-159 mg/dL  High:             160-189 mg/dL  Very high:       >189 mg/dl       Non HDL Cholesterol 03/03/2021 166* <130 mg/dL Final    Comment: Above Desirable:  130-159 mg/dl  Borderline high:  160-189 mg/dl  High:             190-219 mg/dl  Very high:       >219 mg/dl       Color Urine 03/03/2021 Yellow   Final     Appearance Urine 03/03/2021 Clear   Final     Glucose Urine 03/03/2021 Negative  NEG^Negative mg/dL Final     Bilirubin Urine 03/03/2021 Negative  NEG^Negative Final     Ketones Urine 03/03/2021 Negative  NEG^Negative mg/dL Final     Specific Gravity Urine 03/03/2021 1.020  1.003 - 1.035 Final     Blood Urine 03/03/2021 Negative  NEG^Negative Final     pH Urine 03/03/2021 7.5* 5.0 - 7.0 pH Final     Protein Albumin Urine 03/03/2021 Trace* NEG^Negative mg/dL Final     Urobilinogen Urine 03/03/2021 1.0  0.2 - 1.0 EU/dL Final     Nitrite Urine 03/03/2021 Negative  NEG^Negative Final     Leukocyte Esterase Urine 03/03/2021 Negative  NEG^Negative Final     Source 03/03/2021 Midstream Urine   Final      TSH 03/03/2021 1.44  0.40 - 4.00 mU/L Final     Hepatitis C Antibody 03/03/2021 Nonreactive  NR^Nonreactive Final    Comment: Assay performance characteristics have not been established for newborns,   infants, and children       WBC Urine 03/03/2021 0 - 5  OTO5^0 - 5 /HPF Final     RBC Urine 03/03/2021 O - 2  OTO2^O - 2 /HPF Final     Squamous Epithelial /LPF Urine 03/03/2021 Few  FEW^Few /LPF Final     Bacteria Urine 03/03/2021 Few* NEG^Negative /HPF Final     Amorphous Crystals 03/03/2021 Few* NEG^Negative /HPF Final               Video-Visit Details    Type of service:  Video Visit    Video End Time:8:18 AM    Originating Location (pt. Location): Home    Distant Location (provider location):  [facility] [employer]     Platform used for Video Visit: [employer]

## 2021-05-06 ASSESSMENT — ANXIETY QUESTIONNAIRES: GAD7 TOTAL SCORE: 1

## 2021-09-10 ENCOUNTER — PATIENT OUTREACH (OUTPATIENT)
Dept: OBGYN | Facility: CLINIC | Age: 41
End: 2021-09-10

## 2021-09-10 NOTE — LETTER
September 10, 2021      Diana Ferrera  [address] [address] [place] [address]        Dear ,    This letter is to remind you that you are due for your follow-up Pap smear and Human Papillomavirus (HPV) test.    Please call [phone] to schedule your appointment at your earliest convenience.    If you have completed the appointment outside of the [facility] system, please have the records forwarded to our office. We will update your chart for your provider to review before your next annual wellness visit.     Thank you for choosing [facility]!      Sincerely,    Your [facility] Care Team

## 2021-10-03 ENCOUNTER — HEALTH MAINTENANCE LETTER (OUTPATIENT)
Age: 41
End: 2021-10-03

## 2021-11-05 NOTE — TELEPHONE ENCOUNTER
FYI to provider - Patient is lost to pap tracking follow-up. Attempts to contact pt have been made per reminder process and there has been no reply and/or no appt scheduled.       1995 [place]: mild dysplasia  1/1996 LEEP  2004, 2005, 2007, 2007 - all NIL paps  11/28/08 LSIL Pap  12/15/08 [place] Bx: YAO 2-3. Plan LEEP  1/29/09 LEEP: YAO 2-3, margins neg. Plan yearly paps until 2029  1/7/10 NIL pap, neg HR HPV  12/6/10 NIL pap, neg HR HPV  4/16/12 ASCUS pap, neg HR HPV  2/9/16 NIL pap, neg HR HPV  9/25/20 NIL pap, neg HR HPV. Plan 1 year cotest  09/10/21 Reminder Mychart / read by pt, Letter  10/7/21 Reminder call - LM  11/5/21 Lost to follow-up for pap tracking

## 2021-11-10 ENCOUNTER — E-VISIT (OUTPATIENT)
Dept: FAMILY MEDICINE | Facility: CLINIC | Age: 41
End: 2021-11-10
Payer: COMMERCIAL

## 2021-11-10 DIAGNOSIS — R07.9 CHEST PAIN, UNSPECIFIED TYPE: Primary | ICD-10-CM

## 2021-11-10 PROCEDURE — 99207 PR NON-BILLABLE SERV PER CHARTING: CPT | Performed by: FAMILY MEDICINE

## 2021-11-10 NOTE — PATIENT INSTRUCTIONS
Thank you for choosing us for your care. Based on the information provided, I believe you need to be seen immediately.  Please go urgent care as soon as possible.   Chest pain can be related to more serious conditions like heart or lung problems, and that is why it is important to be seen at Urgent care.    You will not be charged for this eVisit.

## 2021-11-28 ENCOUNTER — HEALTH MAINTENANCE LETTER (OUTPATIENT)
Age: 41
End: 2021-11-28

## 2021-12-08 ENCOUNTER — OFFICE VISIT (OUTPATIENT)
Dept: FAMILY MEDICINE | Facility: CLINIC | Age: 41
End: 2021-12-08
Payer: COMMERCIAL

## 2021-12-08 VITALS
HEART RATE: 80 BPM | BODY MASS INDEX: 30.69 KG/M2 | TEMPERATURE: 97.7 F | WEIGHT: 184.2 LBS | HEIGHT: 65 IN | OXYGEN SATURATION: 100 % | DIASTOLIC BLOOD PRESSURE: 84 MMHG | SYSTOLIC BLOOD PRESSURE: 150 MMHG | RESPIRATION RATE: 16 BRPM

## 2021-12-08 DIAGNOSIS — L60.0 INGROWN NAIL: Primary | ICD-10-CM

## 2021-12-08 DIAGNOSIS — M94.0 COSTOCHONDRITIS: ICD-10-CM

## 2021-12-08 DIAGNOSIS — I10 BENIGN ESSENTIAL HYPERTENSION: ICD-10-CM

## 2021-12-08 PROCEDURE — 99214 OFFICE O/P EST MOD 30 MIN: CPT | Performed by: FAMILY MEDICINE

## 2021-12-08 RX ORDER — AMLODIPINE BESYLATE 5 MG/1
5 TABLET ORAL DAILY
Qty: 90 TABLET | Refills: 3 | Status: SHIPPED | OUTPATIENT
Start: 2021-12-08 | End: 2022-03-04

## 2021-12-08 RX ORDER — NAPROXEN 500 MG/1
TABLET ORAL
Qty: 60 TABLET | Refills: 1 | Status: SHIPPED | OUTPATIENT
Start: 2021-12-08 | End: 2022-03-30 | Stop reason: SINTOL

## 2021-12-08 RX ORDER — AMLODIPINE BESYLATE 2.5 MG/1
5 TABLET ORAL DAILY
Qty: 90 TABLET | Refills: 1 | Status: SHIPPED | OUTPATIENT
Start: 2021-12-08 | End: 2021-12-08

## 2021-12-08 RX ORDER — CYCLOBENZAPRINE HCL 5 MG
5 TABLET ORAL 2 TIMES DAILY PRN
Qty: 30 TABLET | Refills: 1 | Status: SHIPPED | OUTPATIENT
Start: 2021-12-08 | End: 2022-03-30

## 2021-12-08 ASSESSMENT — MIFFLIN-ST. JEOR: SCORE: 1489.82

## 2021-12-08 NOTE — PROGRESS NOTES
"  Assessment & Plan     Costochondritis  Handout on the above diagnosis was given to the patient and discussed  Refilled her meds that helped  Will send to NON surgical ortho to see if there are any modalities that may help with this    - Orthopedic  Referral  - cyclobenzaprine (FLEXERIL) 5 MG tablet  Dispense: 30 tablet; Refill: 1  - naproxen (NAPROSYN) 500 MG tablet  Dispense: 60 tablet; Refill: 1    Ingrown nail  Will refer to podiatry    - Orthopedic  Referral    Benign essential hypertension  not under optimal control   Will increase dose to 5 mg per day  Will get her a home blood pressure monitor and call to check on numbers in 1-2 months    - Home Blood Pressure Monitor Order for DME - ONLY FOR DME  - amLODIPine (NORVASC) 2.5 MG tablet  Dispense: 90 tablet; Refill: 1        35 minutes spent on the date of the encounter doing chart review, patient visit and documentation        Tobacco Cessation:   reports that she has been smoking cigarettes. She started smoking about 27 years ago. She has a 26.00 pack-year smoking history. She has never used smokeless tobacco.      BMI:   Estimated body mass index is 30.75 kg/m  as calculated from the following:    Height as of this encounter: 1.648 m (5' 4.9\").    Weight as of this encounter: 83.6 kg (184 lb 3.2 oz).           Return in about 6 months (around 6/8/2022) for Physical Exam.    Judit Olivares MD  [facility]    Dana Ortiz is a 41 year old who presents for the following health issues - she has had issues with chest wall pain for over a year.   She had cxr and ekg and labs done fall 2020 and then in the spring some more labs.    Naproxen and ibuprofen do help but she does not want to take all the time and when she stops the pain comes back.   It is in her sternum at times and then her lower ribs at times.   It is worse when she is in bed and more in the early morning hours rather than when she first lays " "down.   Movement also makes it worse.   It is sharp at times and less intense at times.    She also has had muscle relaxer's which help her sleep some.       She also has hypertension and takes her meds with no problems    She also would like to see a foot doctor due to chronic ingrown great nail.   She tries to   Dig it out\"   But it keeps coming back.   She is looking for a permanent solution.       History of Present Illness       She eats 2-3 servings of fruits and vegetables daily.She consumes 0 sweetened beverage(s) daily.She exercises with enough effort to increase her heart rate 10 to 19 minutes per day.  She exercises with enough effort to increase her heart rate 4 days per week. She is missing 1 dose(s) of medications per week.  She is not taking prescribed medications regularly due to remembering to take.     Chest Pain  Onset/Duration: 2020  Description:   Location: Bilateral lower rib cage  Character: uncomfortable/annoying feeling  Duration: constant   Intensity: mild  Progression of Symptoms: same  Accompanying Signs & Symptoms:  Shortness of breath: no  Sweating: no  Nausea/vomiting: no  Lightheadedness: no  Palpitations: no  Fever/Chills: no  Cough: no           Heartburn: no  History:   Family history of heart disease: YES  Tobacco use: YES  Previous similar symptoms: YES  Precipitating factors:   Worse with exertion: no  Worse with deep breaths: no           Related to eating: no           Better with burping: no    Past Medical History:   Diagnosis Date     Abnormal Pap smear 19, 12     Depressive disorder     I've gone off and on medication most of my adult life.     History of colposcopy 1995    12/15/08     Hypertension        Past Surgical History:   Procedure Laterality Date      SECTION  2014    Procedure:  SECTION;   Section;  Surgeon: Diana Lyons MD;  Location: [facility]     FRACTURE SURGERY  age 5    factured both wrists " "roller blading     INSERT INTRAUTERINE DEVICE  11/05/2020    Mirena - also had one in [id number]     LEEP TX, CERVICAL  01/1996 1/29/09       MEDICATIONS:  Current Outpatient Medications   Medication     cyclobenzaprine (FLEXERIL) 5 MG tablet     naproxen (NAPROSYN) 500 MG tablet     amLODIPine (NORVASC) 2.5 MG tablet     levonorgestrel (MIRENA) 20 MCG/24HR IUD     Multiple Vitamin (MULTI-VITAMIN PO)     Omega-3 Fatty Acids (FISH OIL) 500 MG CAPS     sertraline (ZOLOFT) 50 MG tablet     No current facility-administered medications for this visit.       SOCIAL HISTORY:  Social History     Tobacco Use     Smoking status: Current Every Day Smoker     Packs/day: 1.00     Years: 26.00     Pack years: 26.00     Types: Cigarettes     Start date: 8/1/1994     Smokeless tobacco: Never Used     Tobacco comment: I have quit two times in the past.   Substance Use Topics     Alcohol use: Yes     Comment: before finding out she was pregnant       Family History   Problem Relation Age of Onset     Diabetes Paternal Grandfather      Diabetes Maternal Grandfather      Diabetes Father      Myocardial Infarction Father      Hypertension Father            Review of Systems   Constitutional, HEENT, cardiovascular, pulmonary, gi and gu systems are negative, except as otherwise noted.      Objective    BP (!) 169/92 (BP Location: Right arm, Patient Position: Sitting, Cuff Size: Adult Regular)   Pulse 80   Temp 97.7  F (36.5  C) (Oral)   Resp 16   Ht 1.648 m (5' 4.9\")   Wt 83.6 kg (184 lb 3.2 oz)   SpO2 100%   BMI 30.75 kg/m    Body mass index is 30.75 kg/m .  Physical Exam   GENERAL: healthy, alert and no distress  EYES: Eyes grossly normal to inspection, PERRL and conjunctivae and sclerae normal  HENT: ear canals and TM's normal, nose and mouth without ulcers or lesions  NECK: no adenopathy, no asymmetry, masses, or scars and thyroid normal to palpation  RESP: lungs clear to auscultation - no rales, rhonchi or wheezes  CV: " regular rate and rhythm, normal S1 S2, no S3 or S4, no murmur, click or rub, no peripheral edema and peripheral pulses strong  ABDOMEN: soft, nontender, no hepatosplenomegaly, no masses and bowel sounds normal  MS: some tenderness of lower anterior ribs - no  Bruising - no sternal tenderness today   SKIN: no suspicious lesions or rashes  NEURO: Normal strength and tone, mentation intact and speech normal  PSYCH: mentation appears normal, affect normal/bright  EXT:   Right great nail both medial and lateral edges ingrown - no significant redness or swelling and no drainage.     Office Visit on 03/03/2021   Component Date Value Ref Range Status     Sodium 03/03/2021 138  133 - 144 mmol/L Final     Potassium 03/03/2021 4.4  3.4 - 5.3 mmol/L Final     Chloride 03/03/2021 106  94 - 109 mmol/L Final     Carbon Dioxide 03/03/2021 27  20 - 32 mmol/L Final     Anion Gap 03/03/2021 5  3 - 14 mmol/L Final     Glucose 03/03/2021 94  70 - 99 mg/dL Final     Urea Nitrogen 03/03/2021 13  7 - 30 mg/dL Final     Creatinine 03/03/2021 0.76  0.52 - 1.04 mg/dL Final     GFR Estimate 03/03/2021 >90  >60 mL/min/[1.73_m2] Final    Comment: Non  GFR Calc  Starting 12/18/2018, serum creatinine based estimated GFR (eGFR) will be   calculated using the Chronic Kidney Disease Epidemiology Collaboration   (CKD-EPI) equation.       GFR Estimate If Black 03/03/2021 >90  >60 mL/min/[1.73_m2] Final    Comment:  GFR Calc  Starting 12/18/2018, serum creatinine based estimated GFR (eGFR) will be   calculated using the Chronic Kidney Disease Epidemiology Collaboration   (CKD-EPI) equation.       Calcium 03/03/2021 9.4  8.5 - 10.1 mg/dL Final     Bilirubin Total 03/03/2021 0.6  0.2 - 1.3 mg/dL Final     Albumin 03/03/2021 3.6  3.4 - 5.0 g/dL Final     Protein Total 03/03/2021 7.4  6.8 - 8.8 g/dL Final     Alkaline Phosphatase 03/03/2021 60  40 - 150 U/L Final     ALT 03/03/2021 28  0 - 50 U/L Final     AST 03/03/2021 19   0 - 45 U/L Final     Sed Rate 03/03/2021 9  0 - 20 mm/h Final     Hemoglobin A1C 03/03/2021 5.2  0 - 5.6 % Final    Comment: Normal <5.7% Prediabetes 5.7-6.4%  Diabetes 6.5% or higher - adopted from ADA   consensus guidelines.       Cholesterol 03/03/2021 233* <200 mg/dL Final    Desirable:       <200 mg/dl     Triglycerides 03/03/2021 102  <150 mg/dL Final     HDL Cholesterol 03/03/2021 67  >49 mg/dL Final     LDL Cholesterol Calculated 03/03/2021 146* <100 mg/dL Final    Comment: Above desirable:  100-129 mg/dl  Borderline High:  130-159 mg/dL  High:             160-189 mg/dL  Very high:       >189 mg/dl       Non HDL Cholesterol 03/03/2021 166* <130 mg/dL Final    Comment: Above Desirable:  130-159 mg/dl  Borderline high:  160-189 mg/dl  High:             190-219 mg/dl  Very high:       >219 mg/dl       Color Urine 03/03/2021 Yellow   Final     Appearance Urine 03/03/2021 Clear   Final     Glucose Urine 03/03/2021 Negative  NEG^Negative mg/dL Final     Bilirubin Urine 03/03/2021 Negative  NEG^Negative Final     Ketones Urine 03/03/2021 Negative  NEG^Negative mg/dL Final     Specific Gravity Urine 03/03/2021 1.020  1.003 - 1.035 Final     Blood Urine 03/03/2021 Negative  NEG^Negative Final     pH Urine 03/03/2021 7.5* 5.0 - 7.0 pH Final     Protein Albumin Urine 03/03/2021 Trace* NEG^Negative mg/dL Final     Urobilinogen Urine 03/03/2021 1.0  0.2 - 1.0 EU/dL Final     Nitrite Urine 03/03/2021 Negative  NEG^Negative Final     Leukocyte Esterase Urine 03/03/2021 Negative  NEG^Negative Final     Source 03/03/2021 Midstream Urine   Final     TSH 03/03/2021 1.44  0.40 - 4.00 mU/L Final     Hepatitis C Antibody 03/03/2021 Nonreactive  NR^Nonreactive Final    Comment: Assay performance characteristics have not been established for newborns,   infants, and children       WBC Urine 03/03/2021 0 - 5  OTO5^0 - 5 /HPF Final     RBC Urine 03/03/2021 O - 2  OTO2^O - 2 /HPF Final     Squamous Epithelial /LPF Urine 03/03/2021 Few   FEW^Few /LPF Final     Bacteria Urine 03/03/2021 Few* NEG^Negative /HPF Final     Amorphous Crystals 03/03/2021 Few* NEG^Negative /HPF Final

## 2021-12-08 NOTE — PATIENT INSTRUCTIONS
Patient Education     Chest Wall Pain: Costochondritis    The chest pain that you have had today is caused by costochondritis. This condition is caused by an inflammation of the cartilage joining your ribs to your breastbone. It's not caused by heart or lung problems. Your healthcare team has made sure that the chest pain you feel is not from a life threatening cause of chest pain such as heart attack, collapsed lung, blood clot in the lung, tear in the aorta, or esophageal rupture. The inflammation may have been brought on by a blow to the chest, lifting heavy objects, intense exercise, or an illness that made you cough and sneeze a lot. It often occurs during times of emotional stress. It can be painful, but it's not dangerous. It usually goes away in 1 to 2 weeks. But it may happen again. Rarely, a more serious condition may cause symptoms similar to costochondritis. That s why it s important to watch for the warning signs listed below.   Home care  Follow these guidelines when caring for yourself at home:    If you feel that emotional stress is a cause of your condition, try to figure out the sources of that stress. It may not be obvious. Learn ways to deal with the stress in your life. This can include regular exercise, muscle relaxation, meditation, or simply taking time out for yourself.    You may use acetaminophen, ibuprofen, or naproxen to control pain, unless another pain medicine was prescribed. If you have liver or kidney disease or ever had a stomach ulcer, talk with your healthcare provider before using these medicines.    You can also help ease pain by using a hot, wet compress or heating pad. Use this with or without a medicated skin cream that helps relieves pain.    Do stretching exercise as advised by your provider. Typically rest is beneficial for the first few days. Avoid strenuous activity that worsens the pain.    Take any prescribed medicines as directed.  Follow-up care  Follow up with  your healthcare provider, or as advised.   When to seek medical advice  Call your healthcare provider right away if any of these occur:    A change in the type of pain. Call if it feels different, becomes more serious, lasts longer, or spreads into your shoulder, arm, neck, jaw, or back.    Shortness of breath or pain gets worse when you breathe    Weakness, dizziness, or fainting    Cough with dark-colored sputum (phlegm) or blood    Abdominal pain    Dark red or black stools    Fever of 100.4 F (38 C) or higher, or as directed by your healthcare provider  [employer]ola last reviewed this educational content on 6/1/2019 2000-2021 [employer]. All rights reserved. This information is not intended as a substitute for professional medical care. Always follow your healthcare professional's instructions.           Patient Education     Ingrown Toenail (Excised)  An ingrown toenail occurs when the nail grows sideways into the skin next to the nail. This can cause pain and may lead to an infection with redness, swelling, and sometimes drainage.   The most common cause of an ingrown toenail is trimming your toenails wrong. Most people trim the nails too close to the skin and try to round the nail too tightly around the shape of the toe. When you do this, the nail can grow into the skin of the toe. While it may look nice, your toenail can grow into the skin and cause infection. It's safer to trim the nail to end in a straight line rather than a curve.   Other causes include injury or wearing shoes that are too short or tight. This can cause the same problem that happens when trimming your toenails. Sometimes you are born with a toenail that grows too large for your toe.   The most common symptoms of an ingrown toenail include:    Pain    Redness    Swelling    Drainage  It's important to treat an ingrown toenail as soon as you notice there is a problem. If the infection is mild, you may be able to take care of  it at home. Home care includes:     Frequent warm water soaks    Keeping the nail clean    Wearing loose, comfortable shoes or open toe sandals  Another method to help the toe heal is to use a small piece of cotton or waxed dental floss to gently lift the corner of the problem nail. Change the cotton or floss frequently, especially if it gets dirty.   If your infection is mild but home care isn't working, or the toenail is getting worse, see your healthcare provider. Signs of worsening infection include:     Swelling    Redness     Pus drainage    Pain gets worse  In some cases, part of the toenail needs to be removed by your healthcare provider so that the infection can be drained.   If there is a lot of redness and swelling, then an antibiotic may also be used. The redness and pain should go away within 48 hours. It will take about 2 weeks for the exposed nail bed to become dry and for the swelling to go down.   If only the side of the nail was removed, it will start to grow back in a few months. To prevent recurrence, sometimes the side of the nail bed may be treated with a strong chemical to prevent the nail from growing back.   Home care  Wound care    Twice a day for the first 3 days, clean and soak the toe as follows:  ? Soak your foot in a tub of warm water for 5 minutes. Or hold your toe under a faucet of warm running water for 5 minutes.  ? Clean any remaining crust away with soap and water using a cotton swab.  ? Put a small amount of antibiotic ointment on the infected area.  ? Cover with a bandage until the exposed nail bed is dry and there is no more drainage.    Change the dressing or bandage every time you soak or clean it, or whenever it becomes wet or dirty.    If you were prescribed antibiotics, take them as directed until they are all gone.    While your toe is healing wear comfortable shoes with a lot of toe room or wear open-toe sandals.  Medicines    You can take over-the-counter medicine for  pain, unless you were given a different pain medicine to use. Talk with your healthcare provider before using these medicines if you have chronic liver or kidney disease. Also talk with your provider if you have had a stomach ulcer or digestive bleeding, or you are taking blood-thinner medicines.    If you were given antibiotics, take them until they are all gone. It's important to finish the antibiotics even if the wound looks better. This ensures that the infection completely clears.  Prevention  To prevent ingrown toenails:    Wear shoes that fit well. Don't wear shoes that pinch the toes together.    When you trim your toenails, don t cut them too short. Cut straight across at the top and don t round the edges.    Don t use a sharp object to clean under your nail since this might cause an infection.    If the toenail starts to grow into the skin again, put a small piece of cotton under that side of the nail to help it grow out straight.  Follow-up care  Follow up as advised by your healthcare provider. If the ingrown toenail recurs, follow up with a foot specialist (podiatrist) for nail bed ablation.   When to seek medical care  Call your healthcare provider right away if any of these occur:    Increasing redness, pain, or swelling of the toe    Red streaks in the skin leading away from the wound    Continued pus or fluid drainage for more than 24 hours    Fever of 100.4 F (38 C) or higher, or as directed by your provider  [employer] last reviewed this educational content on 10/1/2019    8006-5827 [employer]. All rights reserved. This information is not intended as a substitute for professional medical care. Always follow your healthcare professional's instructions.           Patient Education     Understanding Ingrown Toenails  Ingrown nails may cause pain at the tip of the toe or all the way to the base of the toe. The pain is often worse while walking. An ingrown nail may also lead to infection,  inflammation, or a more serious condition. If it s infected, you might see pus or redness.   How is an ingrown toenail diagnosed?   Your healthcare provider will examine your toe and possibly press the painful area. If other problems are suspected, blood tests, cultures, and X-rays may be done as well.   Treatment for ingrown toenails  If the nail isn t infected, your healthcare provider may trim the corner of it to help relieve your symptoms. He or she may need to remove one side of your nail back to the cuticle. The base of the nail may then be treated with a chemical to keep the ingrown part from growing back. Severe infections or ingrown nails may require antibiotics and temporary or permanent removal of a portion of the nail. To prevent pain, a local anesthetic may be used in these procedures. This treatment may be done at your healthcare provider's office or at a hospital.   Preventing ingrown toenails  Many nail problems can be prevented by wearing the right shoes and trimming your nails properly. To help avoid infection, keep your feet clean and dry. If you have diabetes, talk with your healthcare provider before doing any foot self-care.     The right shoes: Get your feet measured (your size may change as you age). Wear shoes that are supportive and roomy enough for your toes to wiggle. Look for shoes made of natural materials such as leather, which let your feet to breathe.    Proper trimming: To prevent problems, trim your toenails straight across without cutting down into the corners. If you can t trim your own nails, ask your healthcare provider to do so for you.  [employer] last reviewed this educational content on 6/1/2019 2000-2021 [employer]. All rights reserved. This information is not intended as a substitute for professional medical care. Always follow your healthcare professional's instructions.

## 2021-12-08 NOTE — Clinical Note
Can you check  in with her in 1 month and see how her blood pressure are running at home with her new monitor?

## 2021-12-15 DIAGNOSIS — F41.9 ANXIETY: ICD-10-CM

## 2021-12-16 NOTE — TELEPHONE ENCOUNTER
Routing refill request to provider for review/approval because:  Drug interaction warning    Tika Gutierrez, RN

## 2022-01-05 ENCOUNTER — TELEPHONE (OUTPATIENT)
Dept: FAMILY MEDICINE | Facility: CLINIC | Age: 42
End: 2022-01-05
Payer: COMMERCIAL

## 2022-01-05 NOTE — TELEPHONE ENCOUNTER
Called pt, informs BP doing great! BP readings not available, pt will send [employer] message later to inform DEVAN CHAUDHARY to NEENA Olivares, Judit URBINA MD  P Cr Triage  Can you check  in with her in 1 month and see how her blood pressure are running at home with her new monitor?     Berta Heard RN, BSN  [facility]

## 2022-01-11 ENCOUNTER — E-VISIT (OUTPATIENT)
Dept: FAMILY MEDICINE | Facility: CLINIC | Age: 42
End: 2022-01-11
Payer: COMMERCIAL

## 2022-01-11 DIAGNOSIS — I10 BENIGN ESSENTIAL HYPERTENSION: Primary | ICD-10-CM

## 2022-01-11 PROBLEM — Z97.5 IUD (INTRAUTERINE DEVICE) IN PLACE: Status: ACTIVE | Noted: 2017-05-30

## 2022-01-11 PROCEDURE — 99421 OL DIG E/M SVC 5-10 MIN: CPT | Performed by: FAMILY MEDICINE

## 2022-01-12 NOTE — TELEPHONE ENCOUNTER
Provider E-Visit time total (minutes): 10    I sent this patient a response and have not heard from her.   Can you give her a call and ask her the questions I asked on the evisit?

## 2022-01-12 NOTE — TELEPHONE ENCOUNTER
Attempt #1 to reach patient regarding message below. Left voicemail to return phone call to clinic.       ISMA MetzgerN, RN  [facility]

## 2022-03-04 ENCOUNTER — E-VISIT (OUTPATIENT)
Dept: FAMILY MEDICINE | Facility: CLINIC | Age: 42
End: 2022-03-04
Payer: COMMERCIAL

## 2022-03-04 DIAGNOSIS — I10 BENIGN ESSENTIAL HYPERTENSION: ICD-10-CM

## 2022-03-04 PROCEDURE — 99421 OL DIG E/M SVC 5-10 MIN: CPT | Performed by: FAMILY MEDICINE

## 2022-03-04 RX ORDER — AMLODIPINE BESYLATE 5 MG/1
7.5 TABLET ORAL DAILY
Qty: 135 TABLET | Refills: 0 | Status: SHIPPED | OUTPATIENT
Start: 2022-03-04 | End: 2022-03-30

## 2022-03-04 NOTE — PATIENT INSTRUCTIONS
Thank you for choosing us for your care, Dr. Olivares is out of the office, but I will respond to this visit.  I think you're a correct, your blood pressure is a little on the higher side, and I wonder if we can increase your norvas (amlodipine) to 7.5 mg daily, this will require you to cut the pill in half and take 1 and half pill daily.  Then we can check with you during your upcoming visit and see if the Blood pressure is under better control.   View your full visit summary for details by clicking on the link below. Your pharmacist will able to address any questions you may have about the medication.     If you're not feeling better within 5-7 days, please schedule an appointment.  You can schedule an appointment right here in [facility], or call [phone]  If the visit is for the same symptoms as your eVisit, we'll refund the cost of your eVisit if seen within seven days.

## 2022-03-30 ENCOUNTER — OFFICE VISIT (OUTPATIENT)
Dept: FAMILY MEDICINE | Facility: CLINIC | Age: 42
End: 2022-03-30
Payer: COMMERCIAL

## 2022-03-30 VITALS
SYSTOLIC BLOOD PRESSURE: 130 MMHG | OXYGEN SATURATION: 97 % | HEART RATE: 81 BPM | WEIGHT: 185 LBS | TEMPERATURE: 97.8 F | HEIGHT: 65 IN | RESPIRATION RATE: 12 BRPM | DIASTOLIC BLOOD PRESSURE: 84 MMHG | BODY MASS INDEX: 30.82 KG/M2

## 2022-03-30 DIAGNOSIS — Z13.6 CARDIOVASCULAR SCREENING; LDL GOAL LESS THAN 130: ICD-10-CM

## 2022-03-30 DIAGNOSIS — Z83.3 FAMILY HISTORY OF DIABETES MELLITUS: ICD-10-CM

## 2022-03-30 DIAGNOSIS — Z00.00 ROUTINE GENERAL MEDICAL EXAMINATION AT A HEALTH CARE FACILITY: Primary | ICD-10-CM

## 2022-03-30 DIAGNOSIS — M94.0 COSTOCHONDRITIS: ICD-10-CM

## 2022-03-30 DIAGNOSIS — I10 BENIGN ESSENTIAL HYPERTENSION: ICD-10-CM

## 2022-03-30 DIAGNOSIS — F41.9 ANXIETY: ICD-10-CM

## 2022-03-30 LAB — HBA1C MFR BLD: 5 % (ref 0–5.6)

## 2022-03-30 PROCEDURE — 90471 IMMUNIZATION ADMIN: CPT | Performed by: FAMILY MEDICINE

## 2022-03-30 PROCEDURE — 80061 LIPID PANEL: CPT | Performed by: FAMILY MEDICINE

## 2022-03-30 PROCEDURE — 83036 HEMOGLOBIN GLYCOSYLATED A1C: CPT | Performed by: FAMILY MEDICINE

## 2022-03-30 PROCEDURE — 82043 UR ALBUMIN QUANTITATIVE: CPT | Performed by: FAMILY MEDICINE

## 2022-03-30 PROCEDURE — 36415 COLL VENOUS BLD VENIPUNCTURE: CPT | Performed by: FAMILY MEDICINE

## 2022-03-30 PROCEDURE — 90686 IIV4 VACC NO PRSV 0.5 ML IM: CPT | Performed by: FAMILY MEDICINE

## 2022-03-30 PROCEDURE — 80048 BASIC METABOLIC PNL TOTAL CA: CPT | Performed by: FAMILY MEDICINE

## 2022-03-30 PROCEDURE — 99396 PREV VISIT EST AGE 40-64: CPT | Mod: 25 | Performed by: FAMILY MEDICINE

## 2022-03-30 RX ORDER — AMLODIPINE BESYLATE 5 MG/1
7.5 TABLET ORAL DAILY
Qty: 135 TABLET | Refills: 3 | Status: SHIPPED | OUTPATIENT
Start: 2022-03-30 | End: 2023-03-08

## 2022-03-30 RX ORDER — CYCLOBENZAPRINE HCL 5 MG
5 TABLET ORAL 2 TIMES DAILY PRN
Qty: 90 TABLET | Refills: 1 | Status: SHIPPED | OUTPATIENT
Start: 2022-03-30 | End: 2023-03-08

## 2022-03-30 SDOH — ECONOMIC STABILITY: TRANSPORTATION INSECURITY
IN THE PAST 12 MONTHS, HAS THE LACK OF TRANSPORTATION KEPT YOU FROM MEDICAL APPOINTMENTS OR FROM GETTING MEDICATIONS?: NO

## 2022-03-30 SDOH — ECONOMIC STABILITY: TRANSPORTATION INSECURITY
IN THE PAST 12 MONTHS, HAS LACK OF TRANSPORTATION KEPT YOU FROM MEETINGS, WORK, OR FROM GETTING THINGS NEEDED FOR DAILY LIVING?: NO

## 2022-03-30 SDOH — ECONOMIC STABILITY: INCOME INSECURITY: HOW HARD IS IT FOR YOU TO PAY FOR THE VERY BASICS LIKE FOOD, HOUSING, MEDICAL CARE, AND HEATING?: NOT HARD AT ALL

## 2022-03-30 SDOH — HEALTH STABILITY: PHYSICAL HEALTH: ON AVERAGE, HOW MANY DAYS PER WEEK DO YOU ENGAGE IN MODERATE TO STRENUOUS EXERCISE (LIKE A BRISK WALK)?: 2 DAYS

## 2022-03-30 SDOH — ECONOMIC STABILITY: FOOD INSECURITY: WITHIN THE PAST 12 MONTHS, THE FOOD YOU BOUGHT JUST DIDN'T LAST AND YOU DIDN'T HAVE MONEY TO GET MORE.: NEVER TRUE

## 2022-03-30 SDOH — ECONOMIC STABILITY: INCOME INSECURITY: IN THE LAST 12 MONTHS, WAS THERE A TIME WHEN YOU WERE NOT ABLE TO PAY THE MORTGAGE OR RENT ON TIME?: NO

## 2022-03-30 SDOH — HEALTH STABILITY: PHYSICAL HEALTH: ON AVERAGE, HOW MANY MINUTES DO YOU ENGAGE IN EXERCISE AT THIS LEVEL?: 20 MIN

## 2022-03-30 SDOH — ECONOMIC STABILITY: FOOD INSECURITY: WITHIN THE PAST 12 MONTHS, YOU WORRIED THAT YOUR FOOD WOULD RUN OUT BEFORE YOU GOT MONEY TO BUY MORE.: NEVER TRUE

## 2022-03-30 ASSESSMENT — ENCOUNTER SYMPTOMS
SORE THROAT: 0
HEARTBURN: 0
ARTHRALGIAS: 0
DYSURIA: 0
ABDOMINAL PAIN: 0
FREQUENCY: 0
HEADACHES: 0
EYE PAIN: 0
DIARRHEA: 0
NAUSEA: 0
JOINT SWELLING: 0
COUGH: 0
CHILLS: 0
SHORTNESS OF BREATH: 0
PALPITATIONS: 0
WEAKNESS: 0
CONSTIPATION: 0
FEVER: 0
NERVOUS/ANXIOUS: 0
DIZZINESS: 0
HEMATURIA: 0
HEMATOCHEZIA: 0
PARESTHESIAS: 0
MYALGIAS: 0

## 2022-03-30 ASSESSMENT — LIFESTYLE VARIABLES
HOW MANY STANDARD DRINKS CONTAINING ALCOHOL DO YOU HAVE ON A TYPICAL DAY: 1 OR 2
HOW OFTEN DO YOU HAVE A DRINK CONTAINING ALCOHOL: 2-3 TIMES A WEEK
HOW OFTEN DO YOU HAVE SIX OR MORE DRINKS ON ONE OCCASION: MONTHLY

## 2022-03-30 ASSESSMENT — SOCIAL DETERMINANTS OF HEALTH (SDOH)
HOW OFTEN DO YOU GET TOGETHER WITH FRIENDS OR RELATIVES?: TWICE A WEEK
DO YOU BELONG TO ANY CLUBS OR ORGANIZATIONS SUCH AS CHURCH GROUPS UNIONS, FRATERNAL OR ATHLETIC GROUPS, OR SCHOOL GROUPS?: YES
IN A TYPICAL WEEK, HOW MANY TIMES DO YOU TALK ON THE PHONE WITH FAMILY, FRIENDS, OR NEIGHBORS?: MORE THAN THREE TIMES A WEEK
HOW OFTEN DO YOU ATTEND CHURCH OR RELIGIOUS SERVICES?: 1 TO 4 TIMES PER YEAR

## 2022-03-30 ASSESSMENT — PATIENT HEALTH QUESTIONNAIRE - PHQ9: SUM OF ALL RESPONSES TO PHQ QUESTIONS 1-9: 1

## 2022-03-30 NOTE — PATIENT INSTRUCTIONS
Preventive Health Recommendations  Female Ages 40 to 49    Yearly exam:     See your health care provider every year in order to  1. Review health changes.   2. Discuss preventive care.    3. Review your medicines if your doctor prescribed any.      Get a Pap test every three years (unless you have an abnormal result and your provider advises testing more often).      If you get Pap tests with HPV test, you only need to test every 5 years, unless you have an abnormal result. You do not need a Pap test if your uterus was removed (hysterectomy) and you have not had cancer.      You should be tested each year for STDs (sexually transmitted diseases), if you're at risk.     Ask your doctor if you should have a mammogram.      Have a colonoscopy (test for colon cancer) if someone in your family has had colon cancer or polyps before age 50.       Have a cholesterol test every 5 years.       Have a diabetes test (fasting glucose) after age 45. If you are at risk for diabetes, you should have this test every 3 years.    Shots: Get a flu shot each year. Get a tetanus shot every 10 years.     Nutrition:     Eat at least 5 servings of fruits and vegetables each day.    Eat whole-grain bread, whole-wheat pasta and brown rice instead of white grains and rice.    Get adequate Calcium and Vitamin D.      Lifestyle    Exercise at least 150 minutes a week (an average of 30 minutes a day, 5 days a week). This will help you control your weight and prevent disease.    Limit alcohol to one drink per day.    No smoking.     Wear sunscreen to prevent skin cancer.    See your dentist every six months for an exam and cleaning.  Patient Education   Help for Smokers  What Are Your Reasons for Quitting?  Should I think about quitting smoking?  When it comes to lasting life change, readiness is everything. This may be the perfect time to quit using tobacco. If you have concerns about your health, this is your chance to reflect on your habits  "and make healthy changes.  Perhaps you have tried quitting in the past. It may help to think of quitting as a process that you take one day at a time. Every attempt brings you closer to success. And each time you try, you learn more about what works for you.  What are my reasons for quitting?  The first step in quitting is to decide why quitting is important to you. Here is one good reason:  \"Scientists have identified more than 7,000 chemicals and chemical compounds in tobacco smoke. At least 70 of them are known specifically to cause cancer.\"    --2014 Surgeon General Report  Consider health risks:  Smoking is the leading cause of heart disease, lung disease and stroke.  For the first time, women are as likely to die as men from many diseases caused by smoking.  Smoking can harm a person's health before birth and throughout his or her life.  Smoking causes cancer of the lungs, kidney, stomach, pancreas, cervix and bone marrow.  Smoking leads to impotence (loss of sexual function).  Smoking causes cataracts (clouding of the lens in the eye).  Smoking can cause you to lose teeth.  Smoking can lead to osteoporosis (brittle bone disease). This means a greater risk of broken bones. And if you smoke, your bones will heal more slowly.  Smoking leads to more infections. If you are healing from surgery, your incision has a greater chance of getting infected, which delays healing.  Smoking leads to more trips to the hospital--and longer stays.  Smokers are 30 to 40 percent more likely to develop type 2 diabetes than non-smokers.  About 3 out of 4 teen smokers become adult smokers, even if they plan to quit in a few years.  Secondhand smoke exposure is now known to cause strokes in nonsmokers.  More than 33,000 nonsmokers die every year in the [place] from coronary artery disease caused by exposure to secondhand smoke.  Consider personal reasons:  Smoking costs too much money.  My clothes stink.  I miss out on " "activities with my family because I am away smoking.  My breathing test showed I have the beginnings of emphysema.  A loved one  of cancer.  Smoking seems to control my life.  What other reasons do you have for quitting?  __________________________________________  __________________________________________  Does the way I talk to myself affect my ability to quit smoking?  If you tell yourself you can't quit, it will be very hard to quit. If you tell yourself you are a non-smoker, you will live up to that name.  Quitting happens one day at a time. When you feel a strong urge to smoke, think about your larger goal: to have a free and healthy life.  If I change my behaviors, am I more likely to succeed?  To go to the Super Bowl, a football team must have an action plan. Team members must prepare for the clarke to win. They have to have more than one play. And if they don't make it to the big game, they don't give up--they simply make a new plan for next time.  Like getting to the Super Bowl, quitting smoking calls for more than just willpower. Nicotine is a powerful, addictive drug. Withdrawal symptoms are much like those from cocaine or heroin. To fight them, you need many \"plays\" throughout the day.  Most people do not succeed the first time they try to quit smoking. It may take several tries, and you may need several action plans.  Start your day with a plan. Change how and where you do things. If you always smoke in a certain chair in the living room, don't go there. If you always have a cigarette with a drink, avoid drinking for a while. Other steps you might take:  Instead of smoking in the morning, brush your teeth when you first get up, then eat a healthy breakfast.  Instead of smoking in the car, stash some low-fat treats in the glove box. Or buy special music for the ride.  Instead of smoking after meals, clear the table and go for a walk.  Instead of smoking when you're under stress, do some " "deep-breathing exercises.  When you wake up the next day, start your action plan again. See yourself as a winner. Being free of nicotine puts you in control of your body and health. Don't take your eyes off that goal, even when the going gets rough.  What can I do about nicotine withdrawal?  Nicotine addiction is not about willpower or strength of character. It's about brain chemistry. Nicotine provides a \"kick,\" causing the brain to release chemicals into the body. These chemicals give you a feeling of pleasure. Depending on the dose, they can also make you feel calm. People smoke to keep high levels of these chemicals in the body. The pleasure they feel makes them want to keep smoking.  In the first 48 hours after quitting, nicotine withdrawal can be intense. You may feel irritable and have strong cigarette cravings. You might have a hard time going to sleep or concentrating while awake. You may want to eat more than usual.  The good news is, these symptoms peak within a few days. They should subside within a few weeks.  Do smoking aids help?  Yes. Using a smoking aid--such as a nicotine patch or a medicine like Chantix--can increase your chances for success. Ask your doctor which aid is best for you:  Nicotine patches  Nicotine gum or lozenges  Nicotine nasal spray or a nicotine inhaler (a plastic filter that you puff almost like a cigarette), prescribed by a doctor  Varenicline (Chantix) or bupropion (Zyban, Wellbutrin), prescribed by a doctor  Nicotine replacement helps with the physical addiction. If you combine this with a quit-smoking program, you can double your chances for success.  What is my action plan?  Before you quit, make two lists: reasons you want to quit and reasons you don't. When your first list is twice as long as the second, you will be more likely to succeed.  Pick a quit date. Perhaps it was the day you came to the hospital or clinic.  Tell friends and family.  Stock up on carrots, " sugar-free mints, cinnamon sticks and other items to keep your mouth busy.  Keep your hands busy with drawing, knitting, playing an instrument or other hobbies.  Find support. Will you use nicotine replacement? Attend a class? Join [facility]? Talk with friends who have quit?  Get rid of all cigarettes, lighters, ashtrays and other smoking items.  Keep active. Exercise will release pleasure chemicals in your brain, just like smoking did. Try walking, biking, gardening and other activities.  Drink lots of water.  Reduce or avoid alcohol.  Breathe deeply. When you smoked, you breathed the smoke deep into your lungs. Now picture your lungs filling with fresh, clean air.  Reward yourself with the money you saved by quitting. Go to a movie, take a vacation, buy a car.  Today, there are more former smokers than current smokers. Each year, over half of all daily smokers try to quit.  How quickly will my health improve?  Your body has an amazing way of healing itself over time, if you let it. Here are just a few of the long-term rewards of quitting. But don't forget--all these benefits will end if you light another cigarette.  The moment you stop smoking  The air around you is no longer harmful to children or other adults.  After 20 minutes  Your blood pressure and heart rate drop to normal.  Your hands and feet warm to a normal temperature.  After 8 hours  The carbon monoxide level in your blood drops to normal.  The oxygen level in your blood rises to normal.  After 24 hours  Your chance of heart attack is lower.  After 48 hours  Your sense of smell and taste improve.  After 2 weeks to 3 months  Your blood flow improves.  It's easier for you to walk.  Your lungs work up to 30 percent better than before.  You catch colds and the flu less often.  After 1 to 9 months  You have less coughing and shortness of breath.  You have fewer sinus problems.  You have more energy.  You feel better about yourself because you've done what  you set out to do.  After 1 year  You cut your risk of heart disease in half.  You lower your risk of cancer and lung disease.  You have fewer sick days and health problems.  You reduce the cost of your auto, home and life insurance.  After 10 years  Normal cells replace your pre-cancer cells.  You greatly lower your risk of mouth, larynx, lung and bladder cancer.  Your risk of artery disease is the same as if you had never smoked.  You--and anyone who was exposed to your second-hand smoke--will have a longer life expectancy.  For more information  QUITPLAN (4-580-308-PLAN), the [place] Tobacco Quit Line, provides free professional counseling over the phone.  www.quitpartnermn."[employer]  For informational purposes only. Not to replace the advice of your health care provider. Copyright   2004 [facility]. All rights reserved. Clinically reviewed by Makenzie Smith. Civ[employer] 827665 - REV 11/21.       Patient Education   Resources to Help You Quit Smoking  If you have quit smoking or are thinking about quitting, congratulations! It can be hard to quit smoking, but the benefits are well worth it. To help you quit and stay smoke-free, there are many resources that can help.  Your health plan  If you have health insurance, call them for more details about their phone coaching programs.  [facility]:  3-138-145-BLUE (1-670.207.9631)  [facility]:  3-803-607-QUIT (1-742.760.3986)  [facility]:  1-325-652-BLUE (1-190-559-0966)  [facility]:  0-042-402-0672  [facility]:  1-206.343.3972  [place] [employer] members:  1-130.345.6252  [facility]:   1-766.863.4437  [facility]:  1-137.978.7359  [facility]:  1-853.574.9880  Other resources  American Cancer Society: 1-594.617.3259  The American Cancer Society can help you find local resources to quit smoking.  QUITPLAN: 7-605-407-PLAN (1-925.634.6893)  Offers a telephone helpline,  gum, patches and lozenges. These services are free for the uninsured and those without coverage. The online program is free to everyone at www.quitplan.com.  American Lung Association: 1-800-LUNG-USA (1-498.652.2323)  Provides a lung helpline as well as an online program, self-help book and group clinic support for quitting smoking. www.lung.org/stop-smoking  [employer] [place]:  9-105-103-QUIT (1-137.444.4158)  Offers a telephone hotline, online text chat and a website with tools, information and support for smokers who want to quit. www.smokefree.gov  Medication Therapy Management (MTM):  [phone] (toll free)  mtm@[place].org  This is a clinic program to help you quit smoking. It offers one-on-one sessions with a pharmacist. Call or email to find out if your insurance covers MTM and to schedule an appointment at all locations.  For informational purposes only. Not to replace the advice of your health care provider. Copyright   2013 [facility]. All rights reserved. Clinically reviewed by Adriana Pack MD, [facility] Lung Cancer Screening Program. [employer] [id number] - Rev 06/19.

## 2022-03-30 NOTE — PROGRESS NOTES
SUBJECTIVE:   CC: Diana Ferrera is an 42 year old woman who presents for preventive health visit.     She has no concerns.   She does get costochondritis and taking flexeril about once per day helps.   She would like refills.    She feels her sertraline and amlodipine are working well.       Patient has been advised of split billing requirements and indicates understanding: Yes  Healthy Habits:     Getting at least 3 servings of Calcium per day:  Yes    Bi-annual eye exam:  Yes    Dental care twice a year:  Yes    Sleep apnea or symptoms of sleep apnea:  Daytime drowsiness    Diet:  Regular (no restrictions)    Frequency of exercise:  2-3 days/week    Duration of exercise:  15-30 minutes    Taking medications regularly:  Yes    Medication side effects:  None    PHQ-2 Total Score: 0    Additional concerns today:  Yes              Today's PHQ-2 Score:   PHQ-2 ( 1999 [employer]) 3/30/2022   Q1: Little interest or pleasure in doing things 0   Q2: Feeling down, depressed or hopeless 0   PHQ-2 Score 0   PHQ-2 Total Score (12-17 Years)- Positive if 3 or more points; Administer PHQ-A if positive -   Q1: Little interest or pleasure in doing things Not at all   Q2: Feeling down, depressed or hopeless Not at all   PHQ-2 Score 0       Abuse: Current or Past (Physical, Sexual or Emotional) - No  Do you feel safe in your environment? Yes    Have you ever done Advance Care Planning? (For example, a Health Directive, POLST, or a discussion with a medical provider or your loved ones about your wishes): No, advance care planning information given to patient to review.  Patient plans to discuss their wishes with loved ones or provider.      Social History     Tobacco Use     Smoking status: Current Every Day Smoker     Packs/day: 1.00     Years: 26.00     Pack years: 26.00     Types: Cigarettes     Start date: 8/1/1994     Smokeless tobacco: Never Used     Tobacco comment: I have quit two times in the past.   Substance Use Topics      Alcohol use: Yes         Alcohol Use 3/30/2022   Prescreen: >3 drinks/day or >7 drinks/week? Yes   AUDIT SCORE  6       Reviewed orders with patient.  Reviewed health maintenance and updated orders accordingly - Yes  Labs reviewed in EPIC    Breast Cancer Screening:    Breast CA Risk Assessment (FHS-7) 3/30/2022   Do you have a family history of breast, colon, or ovarian cancer? No / Unknown         Mammogram Screening - Offered annual screening and updated Health Maintenance for mutual plan based on risk factor consideration    Pertinent mammograms are reviewed under the imaging tab.    History of abnormal Pap smear: NO - age 30-65 PAP every 5 years with negative HPV co-testing recommended  PAP / HPV Latest Ref Rng & Units 2020   PAP (Historical) - NIL   HPV16 NEG:Negative Negative   HPV18 NEG:Negative Negative   HRHPV NEG:Negative Negative     Reviewed and updated as needed this visit by clinical staff   Tobacco  Allergies  Meds     Fam Hx  Soc Hx        Reviewed and updated as needed this visit by Provider                 Past Medical History:   Diagnosis Date     Abnormal Pap smear 19, 10     Depressive disorder     I've gone off and on medication most of my adult life.     History of colposcopy 1995    12/15/08     Hypertension        Past Surgical History:   Procedure Laterality Date      SECTION  2014    Procedure:  SECTION;   Section;  Surgeon: Diana Lyons MD;  Location: [facility]     FRACTURE SURGERY  age 5    factured both wrists roller blading     INSERT INTRAUTERINE DEVICE  2020    Mirena - also had one in [id number]     LEEP TX, CERVICAL  09       MEDICATIONS:  Current Outpatient Medications   Medication     amLODIPine (NORVASC) 5 MG tablet     cyclobenzaprine (FLEXERIL) 5 MG tablet     levonorgestrel (MIRENA) 20 MCG/24HR IUD     Multiple Vitamin (MULTI-VITAMIN PO)     naproxen (NAPROSYN) 500 MG tablet      "Omega-3 Fatty Acids (FISH OIL) 500 MG CAPS     sertraline (ZOLOFT) 50 MG tablet     No current facility-administered medications for this visit.       SOCIAL HISTORY:  Social History     Tobacco Use     Smoking status: Current Every Day Smoker     Packs/day: 1.00     Years: 26.00     Pack years: 26.00     Types: Cigarettes     Start date: 8/1/1994     Smokeless tobacco: Never Used     Tobacco comment: I have quit two times in the past.   Substance Use Topics     Alcohol use: Yes       Family History   Problem Relation Age of Onset     Diabetes Paternal Grandfather      Diabetes Maternal Grandfather      Diabetes Father      Myocardial Infarction Father      Hypertension Father            Review of Systems   Constitutional: Negative for chills and fever.   HENT: Negative for congestion, ear pain, hearing loss and sore throat.    Eyes: Negative for pain and visual disturbance.   Respiratory: Negative for cough and shortness of breath.    Cardiovascular: Positive for peripheral edema. Negative for chest pain and palpitations.   Gastrointestinal: Negative for abdominal pain, constipation, diarrhea, heartburn, hematochezia and nausea.   Genitourinary: Negative for dysuria, frequency, genital sores, hematuria and urgency.   Musculoskeletal: Negative for arthralgias, joint swelling and myalgias.   Skin: Negative for rash.   Neurological: Negative for dizziness, weakness, headaches and paresthesias.   Psychiatric/Behavioral: Negative for mood changes. The patient is not nervous/anxious.           OBJECTIVE:   BP (!) 132/92 (BP Location: Right arm, Patient Position: Sitting, Cuff Size: Adult Regular)   Pulse 81   Temp 97.8  F (36.6  C) (Oral)   Resp 12   Ht 1.648 m (5' 4.9\")   Wt 83.9 kg (185 lb)   LMP  (Exact Date)   SpO2 97%   BMI 30.88 kg/m    Physical Exam  GENERAL: healthy, alert and no distress  EYES: Eyes grossly normal to inspection, PERRL and conjunctivae and sclerae normal  HENT: ear canals and TM's normal, " nose and mouth without ulcers or lesions  NECK: no adenopathy, no asymmetry, masses, or scars and thyroid normal to palpation  RESP: lungs clear to auscultation - no rales, rhonchi or wheezes  BREAST: normal without masses, tenderness or nipple discharge and no palpable axillary masses or adenopathy  CV: regular rate and rhythm, normal S1 S2, no S3 or S4, no murmur, click or rub, no peripheral edema and peripheral pulses strong  ABDOMEN: soft, nontender, no hepatosplenomegaly, no masses and bowel sounds normal  MS: no gross musculoskeletal defects noted, no edema  SKIN: no suspicious lesions or rashes  NEURO: Normal strength and tone, mentation intact and speech normal  PSYCH: mentation appears normal, affect normal/bright  LYMPH: no cervical, supraclavicular, axillary, or inguinal adenopathy    Diagnostic Test Results:  Labs reviewed in Epic    ASSESSMENT/PLAN:   (Z00.00) Routine general medical examination at a health care facility  (primary encounter diagnosis)  Comment:   Plan: Basic metabolic panel  (Ca, Cl, CO2, Creat,         Gluc, K, Na, BUN)            (I10) Benign essential hypertension  Comment: under good control   Plan: amLODIPine (NORVASC) 5 MG tablet, Albumin         Random Urine Quantitative with Creat Ratio        Refills per epicare     (F41.9) Anxiety  Comment: stable  Plan: sertraline (ZOLOFT) 50 MG tablet        Refills per epicare     (M94.0) Costochondritis  Comment: under control - better  Plan: cyclobenzaprine (FLEXERIL) 5 MG tablet        Refills per epicare     (Z83.3) Family history of diabetes mellitus  Comment:   Plan: Hemoglobin A1c, Lipid panel reflex to direct         LDL Fasting            (Z13.6) CARDIOVASCULAR SCREENING; LDL GOAL LESS THAN 130  Comment:   Plan: Lipid panel reflex to direct LDL Fasting              Patient has been advised of split billing requirements and indicates understanding: Yes    COUNSELING:  Reviewed preventive health counseling, as reflected in patient  "instructions  Special attention given to:        Regular exercise       Healthy diet/nutrition       Colorectal Cancer Screening    Estimated body mass index is 30.88 kg/m  as calculated from the following:    Height as of this encounter: 1.648 m (5' 4.9\").    Weight as of this encounter: 83.9 kg (185 lb).    Weight management plan: Discussed healthy diet and exercise guidelines    She reports that she has been smoking cigarettes. She started smoking about 27 years ago. She has a 26.00 pack-year smoking history. She has never used smokeless tobacco.  Tobacco Cessation Action Plan:   Self help information given to patient      Counseling Resources:  ATP IV Guidelines  Pooled Cohorts Equation Calculator  Breast Cancer Risk Calculator  BRCA-Related Cancer Risk Assessment: FHS-7 Tool  FRAX Risk Assessment  ICSI Preventive Guidelines  Dietary Guidelines for Americans, 2010  USDA's MyPlate  ASA Prophylaxis  Lung CA Screening    Judit Olivares MD  [facility]  "

## 2022-03-31 LAB
ANION GAP SERPL CALCULATED.3IONS-SCNC: 2 MMOL/L (ref 3–14)
BUN SERPL-MCNC: 8 MG/DL (ref 7–30)
CALCIUM SERPL-MCNC: 9.3 MG/DL (ref 8.5–10.1)
CHLORIDE BLD-SCNC: 105 MMOL/L (ref 94–109)
CHOLEST SERPL-MCNC: 227 MG/DL
CO2 SERPL-SCNC: 28 MMOL/L (ref 20–32)
CREAT SERPL-MCNC: 0.75 MG/DL (ref 0.52–1.04)
CREAT UR-MCNC: 140 MG/DL
FASTING STATUS PATIENT QL REPORTED: ABNORMAL
GFR SERPL CREATININE-BSD FRML MDRD: >90 ML/MIN/1.73M2
GLUCOSE BLD-MCNC: 104 MG/DL (ref 70–99)
HDLC SERPL-MCNC: 56 MG/DL
LDLC SERPL CALC-MCNC: 151 MG/DL
MICROALBUMIN UR-MCNC: 42 MG/L
MICROALBUMIN/CREAT UR: 30 MG/G CR (ref 0–25)
NONHDLC SERPL-MCNC: 171 MG/DL
POTASSIUM BLD-SCNC: 4.1 MMOL/L (ref 3.4–5.3)
SODIUM SERPL-SCNC: 135 MMOL/L (ref 133–144)
TRIGL SERPL-MCNC: 102 MG/DL

## 2022-06-07 DIAGNOSIS — I10 BENIGN ESSENTIAL HYPERTENSION: ICD-10-CM

## 2022-06-08 RX ORDER — AMLODIPINE BESYLATE 5 MG/1
TABLET ORAL
Qty: 135 TABLET | Refills: 3 | OUTPATIENT
Start: 2022-06-08

## 2022-09-04 ENCOUNTER — HEALTH MAINTENANCE LETTER (OUTPATIENT)
Age: 42
End: 2022-09-04

## 2023-01-13 ENCOUNTER — ANCILLARY PROCEDURE (OUTPATIENT)
Dept: MAMMOGRAPHY | Facility: CLINIC | Age: 43
End: 2023-01-13
Attending: OBSTETRICS & GYNECOLOGY
Payer: COMMERCIAL

## 2023-01-13 DIAGNOSIS — Z12.31 VISIT FOR SCREENING MAMMOGRAM: ICD-10-CM

## 2023-01-13 PROCEDURE — 77067 SCR MAMMO BI INCL CAD: CPT | Mod: TC | Performed by: RADIOLOGY

## 2023-01-13 PROCEDURE — 77063 BREAST TOMOSYNTHESIS BI: CPT | Mod: TC | Performed by: RADIOLOGY

## 2023-03-02 ASSESSMENT — ENCOUNTER SYMPTOMS
COUGH: 0
SORE THROAT: 0
ABDOMINAL PAIN: 0
FEVER: 0
BREAST MASS: 0
HEADACHES: 0
DYSURIA: 0
EYE PAIN: 0
WEAKNESS: 0
HEMATURIA: 0
MYALGIAS: 0
PARESTHESIAS: 0
CONSTIPATION: 0
FREQUENCY: 0
SHORTNESS OF BREATH: 0
HEARTBURN: 0
JOINT SWELLING: 0
CHILLS: 0
DIARRHEA: 0
NERVOUS/ANXIOUS: 0
PALPITATIONS: 0
HEMATOCHEZIA: 0
DIZZINESS: 0
ARTHRALGIAS: 0
NAUSEA: 0

## 2023-03-08 ENCOUNTER — OFFICE VISIT (OUTPATIENT)
Dept: FAMILY MEDICINE | Facility: CLINIC | Age: 43
End: 2023-03-08
Payer: COMMERCIAL

## 2023-03-08 VITALS
SYSTOLIC BLOOD PRESSURE: 134 MMHG | DIASTOLIC BLOOD PRESSURE: 83 MMHG | WEIGHT: 175 LBS | TEMPERATURE: 98.3 F | HEART RATE: 76 BPM | HEIGHT: 65 IN | OXYGEN SATURATION: 97 % | RESPIRATION RATE: 16 BRPM | BODY MASS INDEX: 29.16 KG/M2

## 2023-03-08 DIAGNOSIS — Z83.3 FAMILY HISTORY OF DIABETES MELLITUS: ICD-10-CM

## 2023-03-08 DIAGNOSIS — L60.0 INGROWN NAIL: ICD-10-CM

## 2023-03-08 DIAGNOSIS — Z12.11 SCREEN FOR COLON CANCER: Primary | ICD-10-CM

## 2023-03-08 DIAGNOSIS — M94.0 COSTOCHONDRITIS: ICD-10-CM

## 2023-03-08 DIAGNOSIS — Z00.00 ROUTINE GENERAL MEDICAL EXAMINATION AT A HEALTH CARE FACILITY: ICD-10-CM

## 2023-03-08 DIAGNOSIS — I10 BENIGN ESSENTIAL HYPERTENSION: ICD-10-CM

## 2023-03-08 DIAGNOSIS — F41.9 ANXIETY: ICD-10-CM

## 2023-03-08 DIAGNOSIS — Z13.6 CARDIOVASCULAR SCREENING; LDL GOAL LESS THAN 130: ICD-10-CM

## 2023-03-08 DIAGNOSIS — Z82.49 FAMILY HISTORY OF ISCHEMIC HEART DISEASE: ICD-10-CM

## 2023-03-08 LAB
ALBUMIN SERPL BCG-MCNC: 4.3 G/DL (ref 3.5–5.2)
ALP SERPL-CCNC: 62 U/L (ref 35–104)
ALT SERPL W P-5'-P-CCNC: 28 U/L (ref 10–35)
ANION GAP SERPL CALCULATED.3IONS-SCNC: 12 MMOL/L (ref 7–15)
AST SERPL W P-5'-P-CCNC: 25 U/L (ref 10–35)
BILIRUB SERPL-MCNC: 0.6 MG/DL
BUN SERPL-MCNC: 14 MG/DL (ref 6–20)
CALCIUM SERPL-MCNC: 9.5 MG/DL (ref 8.6–10)
CHLORIDE SERPL-SCNC: 102 MMOL/L (ref 98–107)
CHOLEST SERPL-MCNC: 236 MG/DL
CREAT SERPL-MCNC: 0.82 MG/DL (ref 0.51–0.95)
CREAT UR-MCNC: 221 MG/DL
CRP SERPL HS-MCNC: 16.7 MG/L
DEPRECATED HCO3 PLAS-SCNC: 24 MMOL/L (ref 22–29)
GFR SERPL CREATININE-BSD FRML MDRD: 89 ML/MIN/1.73M2
GLUCOSE SERPL-MCNC: 89 MG/DL (ref 70–99)
HBA1C MFR BLD: 5.2 % (ref 0–5.6)
HDLC SERPL-MCNC: 53 MG/DL
LDLC SERPL CALC-MCNC: 157 MG/DL
MICROALBUMIN UR-MCNC: 15.4 MG/L
MICROALBUMIN/CREAT UR: 6.97 MG/G CR (ref 0–25)
NONHDLC SERPL-MCNC: 183 MG/DL
POTASSIUM SERPL-SCNC: 4.3 MMOL/L (ref 3.4–5.3)
PROT SERPL-MCNC: 7.1 G/DL (ref 6.4–8.3)
SODIUM SERPL-SCNC: 138 MMOL/L (ref 136–145)
TRIGL SERPL-MCNC: 130 MG/DL

## 2023-03-08 PROCEDURE — 87624 HPV HI-RISK TYP POOLED RSLT: CPT | Performed by: FAMILY MEDICINE

## 2023-03-08 PROCEDURE — 99396 PREV VISIT EST AGE 40-64: CPT | Performed by: FAMILY MEDICINE

## 2023-03-08 PROCEDURE — 36415 COLL VENOUS BLD VENIPUNCTURE: CPT | Performed by: FAMILY MEDICINE

## 2023-03-08 PROCEDURE — 86141 C-REACTIVE PROTEIN HS: CPT | Performed by: FAMILY MEDICINE

## 2023-03-08 PROCEDURE — 82570 ASSAY OF URINE CREATININE: CPT | Performed by: FAMILY MEDICINE

## 2023-03-08 PROCEDURE — 99214 OFFICE O/P EST MOD 30 MIN: CPT | Mod: 25 | Performed by: FAMILY MEDICINE

## 2023-03-08 PROCEDURE — 80053 COMPREHEN METABOLIC PANEL: CPT | Performed by: FAMILY MEDICINE

## 2023-03-08 PROCEDURE — 82043 UR ALBUMIN QUANTITATIVE: CPT | Performed by: FAMILY MEDICINE

## 2023-03-08 PROCEDURE — 80061 LIPID PANEL: CPT | Performed by: FAMILY MEDICINE

## 2023-03-08 PROCEDURE — 83036 HEMOGLOBIN GLYCOSYLATED A1C: CPT | Performed by: FAMILY MEDICINE

## 2023-03-08 PROCEDURE — G0145 SCR C/V CYTO,THINLAYER,RESCR: HCPCS | Performed by: FAMILY MEDICINE

## 2023-03-08 RX ORDER — AMLODIPINE BESYLATE 5 MG/1
5 TABLET ORAL DAILY
Qty: 90 TABLET | Refills: 4 | Status: SHIPPED | OUTPATIENT
Start: 2023-03-08 | End: 2024-04-03

## 2023-03-08 ASSESSMENT — ENCOUNTER SYMPTOMS
ABDOMINAL PAIN: 0
DIZZINESS: 0
FEVER: 0
CHILLS: 0
HEMATURIA: 0
HEADACHES: 0
MYALGIAS: 0
EYE PAIN: 0
PALPITATIONS: 0
ARTHRALGIAS: 0
HEARTBURN: 0
CONSTIPATION: 0
SORE THROAT: 0
NERVOUS/ANXIOUS: 0
FREQUENCY: 0
DIARRHEA: 0
HEMATOCHEZIA: 0
SHORTNESS OF BREATH: 0
BREAST MASS: 0
NAUSEA: 0
JOINT SWELLING: 0
COUGH: 0
DYSURIA: 0
WEAKNESS: 0
PARESTHESIAS: 0

## 2023-03-08 NOTE — PROGRESS NOTES
SUBJECTIVE:   CC: Diana is an 43 year old who presents for preventive health visit.    She has a couple concerns:  1- ingrown toenails.   The right is worse than the left.   She has tried to dig these out and cannot seem to help the situation  2- she has ongoing trouble with chest wall pain for over a year.   She had EKG and labs and was told costochondritis.   It is worst when llying down.   It is on both sides.   Her dads side of the family has bad heart disease.     3- she would like refills of her meds for mood and blood pressure.   She feels they are working well with no side effects.       Patient has been advised of split billing requirements and indicates understanding: Yes  Healthy Habits:     Getting at least 3 servings of Calcium per day:  Yes    Bi-annual eye exam:  Yes    Dental care twice a year:  Yes    Sleep apnea or symptoms of sleep apnea:  None    Diet:  Regular (no restrictions)    Frequency of exercise:  2-3 days/week    Duration of exercise:  15-30 minutes    Taking medications regularly:  Yes    Medication side effects:  Not applicable    PHQ-2 Total Score: 0    Additional concerns today:  Yes          Today's PHQ-2 Score:   PHQ-2 ( 1999 [employer]) 3/2/2023   Q1: Little interest or pleasure in doing things 0   Q2: Feeling down, depressed or hopeless 0   PHQ-2 Score 0   PHQ-2 Total Score (12-17 Years)- Positive if 3 or more points; Administer PHQ-A if positive -   Q1: Little interest or pleasure in doing things Not at all   Q2: Feeling down, depressed or hopeless Not at all   PHQ-2 Score 0           Social History     Tobacco Use     Smoking status: Every Day     Packs/day: 1.00     Years: 26.00     Pack years: 26.00     Types: Cigarettes     Start date: 8/1/1994     Smokeless tobacco: Never     Tobacco comments:     I have quit two times in the past.   Substance Use Topics     Alcohol use: Yes         Alcohol Use 3/2/2023   Prescreen: >3 drinks/day or >7 drinks/week? Yes   AUDIT SCORE  9      AUDIT - Alcohol Use Disorders Identification Test - Reproduced from the World Health Organization Audit 2001 (Second Edition) 3/2/2023   1.  How often do you have a drink containing alcohol? 4 or more times a week   2.  How many drinks containing alcohol do you have on a typical day when you are drinking? 3 or 4   3.  How often do you have five or more drinks on one occasion? Weekly   4.  How often during the last year have you found that you were not able to stop drinking once you had started? Never   5.  How often during the last year have you failed to do what was normally expected of you because of drinking? Never   6.  How often during the last year have you needed a first drink in the morning to get yourself going after a heavy drinking session? Never   7.  How often during the last year have you had a feeling of guilt or remorse after drinking? Never   8.  How often during the last year have you been unable to remember what happened the night before because of your drinking? Less than monthly   9.  Have you or someone else been injured because of your drinking? No   10. Has a relative, friend, doctor or other health care worker been concerned about your drinking or suggested you cut down? No   TOTAL SCORE 9       Reviewed orders with patient.  Reviewed health maintenance and updated orders accordingly - Yes  Labs reviewed in EPIC    Breast Cancer Screening:    Breast CA Risk Assessment (FHS-7) 3/30/2022   Do you have a family history of breast, colon, or ovarian cancer? No / Unknown         Mammogram Screening: Recommended annual mammography  Pertinent mammograms are reviewed under the imaging tab.    History of abnormal Pap smear: YES - updated in Problem List and Health Maintenance accordingly  PAP / HPV Latest Ref Rng & Units 9/25/2020   PAP (Historical) - NIL   HPV16 NEG:Negative Negative   HPV18 NEG:Negative Negative   HRHPV NEG:Negative Negative     Reviewed and updated as needed this visit by  clinical staff   Tobacco  Allergies  Meds              Past Medical History:   Diagnosis Date     Abnormal Pap smear 19, 12     Depressive disorder     I've gone off and on medication most of my adult life.     History of colposcopy 1995    12/15/08     Hypertension      Rash 2021    right side - wonder about shingles vs reaction to naproxen       Past Surgical History:   Procedure Laterality Date      SECTION  2014    Procedure:  SECTION;   Section;  Surgeon: Diana Lyons MD;  [facility]     FRACTURE SURGERY  age 5    factured both wrists roller blading     INSERT INTRAUTERINE DEVICE  2020    Mirena - also had one in [id number]     LEEP TX, CERVICAL  09       MEDICATIONS:  Current Outpatient Medications   Medication     amLODIPine (NORVASC) 5 MG tablet     levonorgestrel (MIRENA) 20 MCG/24HR IUD     Multiple Vitamin (MULTI-VITAMIN PO)     Omega-3 Fatty Acids (FISH OIL) 500 MG CAPS     sertraline (ZOLOFT) 50 MG tablet     No current facility-administered medications for this visit.       SOCIAL HISTORY:  Social History     Tobacco Use     Smoking status: Every Day     Packs/day: 1.00     Years: 26.00     Pack years: 26.00     Types: Cigarettes     Start date: 1994     Smokeless tobacco: Never     Tobacco comments:     I have quit two times in the past.   Substance Use Topics     Alcohol use: Yes       Family History   Problem Relation Age of Onset     Diabetes Paternal Grandfather      Diabetes Maternal Grandfather      Diabetes Father      Myocardial Infarction Father      Hypertension Father             Review of Systems   Constitutional: Negative for chills and fever.   HENT: Negative for congestion, ear pain, hearing loss and sore throat.    Eyes: Negative for pain and visual disturbance.   Respiratory: Negative for cough and shortness of breath.    Cardiovascular: Positive for chest pain. Negative for  "palpitations and peripheral edema.   Gastrointestinal: Negative for abdominal pain, constipation, diarrhea, heartburn, hematochezia and nausea.   Breasts:  Negative for tenderness, breast mass and discharge.   Genitourinary: Negative for dysuria, frequency, genital sores, hematuria, pelvic pain, urgency, vaginal bleeding and vaginal discharge.   Musculoskeletal: Negative for arthralgias, joint swelling and myalgias.   Skin: Negative for rash.   Neurological: Negative for dizziness, weakness, headaches and paresthesias.   Psychiatric/Behavioral: Negative for mood changes. The patient is not nervous/anxious.           OBJECTIVE:   /83 (BP Location: Right arm, Patient Position: Sitting, Cuff Size: Adult Large)   Pulse 76   Temp 98.3  F (36.8  C) (Oral)   Resp 16   Ht 1.645 m (5' 4.75\")   Wt 79.4 kg (175 lb)   SpO2 97%   BMI 29.35 kg/m    Physical Exam  GENERAL: healthy, alert and no distress  EYES: Eyes grossly normal to inspection, PERRL and conjunctivae and sclerae normal  HENT: ear canals and TM's normal, nose and mouth without ulcers or lesions  NECK: no adenopathy, no asymmetry, masses, or scars and thyroid normal to palpation  RESP: lungs clear to auscultation - no rales, rhonchi or wheezes  BREAST: normal without masses, tenderness or nipple discharge and no palpable axillary masses or adenopathy  CV: regular rate and rhythm, normal S1 S2, no S3 or S4, no murmur, click or rub, no peripheral edema and peripheral pulses strong  ABDOMEN: soft, nontender, no hepatosplenomegaly, no masses and bowel sounds normal   (female): normal female external genitalia, normal urethral meatus, vaginal mucosa pink, moist, well rugated, and normal cervix/adnexa/uterus without masses or discharge  MS: no gross musculoskeletal defects noted, no edema  SKIN: no suspicious lesions or rashes  NEURO: Normal strength and tone, mentation intact and speech normal  PSYCH: mentation appears normal, affect normal/bright  LYMPH: " no cervical, supraclavicular, axillary, or inguinal adenopathy    Diagnostic Test Results:  Labs reviewed in Epic    ASSESSMENT/PLAN:   (Z12.11) Screen for colon cancer  (primary encounter diagnosis)  Comment:   Plan: COLOGUARD(EXACT SCIENCES)            (Z00.00) Routine general medical examination at a health care facility  Comment:   Plan: REVIEW OF HEALTH MAINTENANCE PROTOCOL ORDERS,         Pap screen with HPV - recommended age 30 - 65         years            (F41.9) Anxiety  Comment: stable  Plan: sertraline (ZOLOFT) 50 MG tablet, Comprehensive        metabolic panel (BMP + Alb, Alk Phos, ALT, AST,        Total. Bili, TP)        continue    (I10) Benign essential hypertension  Comment: under control  Plan: amLODIPine (NORVASC) 5 MG tablet, Albumin         Random Urine Quantitative with Creat Ratio,         Lipid panel reflex to direct LDL Fasting,         Comprehensive metabolic panel (BMP + Alb, Alk         Phos, ALT, AST, Total. Bili, TP), OFFICE/OUTPT         VISIT,EST,LEVL III        Handout on the above diagnosis was given to the patient and discussed  Refills per [facility]     (Z82.49) Family history of ischemic heart disease  Comment:   Plan: CRP cardiac risk, CT Coronary Calcium Scan        Discussed CT with calcium scoring    (Z13.6) CARDIOVASCULAR SCREENING; LDL GOAL LESS THAN 130  Comment:   Plan: Lipid panel reflex to direct LDL Fasting,         Comprehensive metabolic panel (BMP + Alb, Alk         Phos, ALT, AST, Total. Bili, TP)            (Z83.3) Family history of diabetes mellitus  Comment:   Plan: Comprehensive metabolic panel (BMP + Alb, Alk         Phos, ALT, AST, Total. Bili, TP), Hemoglobin         A1c            (L60.0) Ingrown nail  Comment:   Plan: Orthopedic  Referral, OFFICE/OUTPT         VISIT,EST,LEVL III            (M94.0) Costochondritis  Comment:   Plan: OFFICE/OUTPT VISIT,EST,LEVL III              Patient has been advised of split billing requirements and indicates  "understanding: Yes      COUNSELING:  Reviewed preventive health counseling, as reflected in patient instructions  Special attention given to:        Regular exercise       Immunizations    Declined: Covid-19 due to Other does not want                Colorectal Cancer Screening      BMI:   Estimated body mass index is 29.35 kg/m  as calculated from the following:    Height as of this encounter: 1.645 m (5' 4.75\").    Weight as of this encounter: 79.4 kg (175 lb).         She reports that she has been smoking cigarettes. She started smoking about 28 years ago. She has a 26.00 pack-year smoking history. She has never used smokeless tobacco.  Nicotine/Tobacco Cessation Plan:   Information offered: Patient not interested at this time      Judit Olivares MD  [facility]  "

## 2023-03-09 ENCOUNTER — LAB (OUTPATIENT)
Dept: FAMILY MEDICINE | Facility: CLINIC | Age: 43
End: 2023-03-09
Payer: COMMERCIAL

## 2023-03-09 DIAGNOSIS — Z12.11 SCREEN FOR COLON CANCER: ICD-10-CM

## 2023-03-10 LAB
BKR LAB AP GYN ADEQUACY: NORMAL
BKR LAB AP GYN INTERPRETATION: NORMAL
BKR LAB AP HPV REFLEX: NORMAL
BKR LAB AP PREVIOUS ABNL DX: NORMAL
BKR LAB AP PREVIOUS ABNORMAL: NORMAL
PATH REPORT.COMMENTS IMP SPEC: NORMAL
PATH REPORT.COMMENTS IMP SPEC: NORMAL
PATH REPORT.RELEVANT HX SPEC: NORMAL

## 2023-03-14 LAB
HUMAN PAPILLOMA VIRUS 16 DNA: NEGATIVE
HUMAN PAPILLOMA VIRUS 18 DNA: NEGATIVE
HUMAN PAPILLOMA VIRUS FINAL DIAGNOSIS: NORMAL
HUMAN PAPILLOMA VIRUS OTHER HR: NEGATIVE

## 2023-03-16 ENCOUNTER — OFFICE VISIT (OUTPATIENT)
Dept: PODIATRY | Facility: CLINIC | Age: 43
End: 2023-03-16
Attending: FAMILY MEDICINE
Payer: COMMERCIAL

## 2023-03-16 VITALS
DIASTOLIC BLOOD PRESSURE: 78 MMHG | HEIGHT: 64 IN | SYSTOLIC BLOOD PRESSURE: 128 MMHG | BODY MASS INDEX: 29.88 KG/M2 | WEIGHT: 175 LBS

## 2023-03-16 DIAGNOSIS — L60.0 INGROWN NAIL: ICD-10-CM

## 2023-03-16 DIAGNOSIS — L60.0 ONYCHOCRYPTOSIS: Primary | ICD-10-CM

## 2023-03-16 PROCEDURE — 99203 OFFICE O/P NEW LOW 30 MIN: CPT | Mod: 25 | Performed by: PODIATRIST

## 2023-03-16 PROCEDURE — G0127 TRIM NAIL(S): HCPCS | Performed by: PODIATRIST

## 2023-03-16 NOTE — PROGRESS NOTES
"Foot & Ankle Surgery  2023    CC: \"Ingrown toenails\"    I was asked to see Diana Ferrera regarding the chief complaint by: Dr. Holman    HPI:  Pt is a 43 year old female who presents with above complaint.  20-year history of ingrown toenails bilateral lower extremity.  She states the lateral left hallux is the main problem.  She has been able to trim these on the past with success.  No previous procedures.  She describes \"shooting\", 3 out of 10 \"weekly\", worse with \"letting nails grow longer\".  \"Pedicures\" for treatment.  She is going on a cruise next week    ROS:   Pos for CC.  The patient denies current nausea, vomiting, chills, fevers, belly pain, calf pain, chest pain or SOB.  Complete remainder of ROS is otherwise neg.    VITALS:    Vitals:    23 1331   BP: 128/78   Weight: 79.4 kg (175 lb)   Height: 1.626 m (5' 4\")       PMH:    Past Medical History:   Diagnosis Date     Abnormal Pap smear 19, 12     Depressive disorder     I've gone off and on medication most of my adult life.     History of colposcopy 1995    12/15/08     Hypertension      Rash 2021    right side - wonder about shingles vs reaction to naproxen       SXHX:    Past Surgical History:   Procedure Laterality Date      SECTION  2014    Procedure:  SECTION;   Section;  Surgeon: Diana Lyons MD;  [facility]     FRACTURE SURGERY  age 5    factured both wrists roller blading     INSERT INTRAUTERINE DEVICE  2020    Mirena - also had one in [id number]     LEEP TX, CERVICAL  09        MEDS:    Current Outpatient Medications   Medication     amLODIPine (NORVASC) 5 MG tablet     levonorgestrel (MIRENA) 20 MCG/24HR IUD     Multiple Vitamin (MULTI-VITAMIN PO)     Omega-3 Fatty Acids (FISH OIL) 500 MG CAPS     sertraline (ZOLOFT) 50 MG tablet     No current facility-administered medications for this visit.       ALL:   No Known Allergies    FMH:  "   Family History   Problem Relation Age of Onset     Diabetes Paternal Grandfather      Diabetes Maternal Grandfather      Diabetes Father      Myocardial Infarction Father      Hypertension Father        SocHx:    Social History     Socioeconomic History     Marital status:      Spouse name: Adama     Number of children: 1     Years of education: Not on file     Highest education level: Not on file   Occupational History     Occupation: manager     Comment:    Tobacco Use     Smoking status: Every Day     Packs/day: 1.00     Years: 26.00     Pack years: 26.00     Types: Cigarettes     Start date: 8/1/1994     Smokeless tobacco: Never     Tobacco comments:     I have quit two times in the past.   Vaping Use     Vaping Use: Never used   Substance and Sexual Activity     Alcohol use: Yes     Drug use: No     Sexual activity: Yes     Partners: Male     Birth control/protection: I.U.D.   Other Topics Concern     Parent/sibling w/ CABG, MI or angioplasty before 65F 55M? Yes     Comment: My dad   Social History Narrative     Not on file     Social Determinants of Health     Financial Resource Strain: Low Risk      Difficulty of Paying Living Expenses: Not hard at all   Food Insecurity: No Food Insecurity     Worried About Running Out of Food in the Last Year: Never true     Ran Out of Food in the Last Year: Never true   Transportation Needs: No Transportation Needs     Lack of Transportation (Medical): No     Lack of Transportation (Non-Medical): No   Physical Activity: Insufficiently Active     Days of Exercise per Week: 2 days     Minutes of Exercise per Session: 20 min   Stress: Stress Concern Present     Feeling of Stress : To some extent   Social Connections: Socially Integrated     Frequency of Communication with Friends and Family: More than three times a week     Frequency of Social Gatherings with Friends and Family: Twice a week     Attends [employer] Services: 1 to 4 times per year      Active Member of Clubs or Organizations: Yes     Attends Club or Organization Meetings: Not on file     Marital Status:    Intimate Partner Violence: Not on file   Housing Stability: Low Risk      Unable to Pay for Housing in the Last Year: No     Number of Places Lived in the Last Year: 1     Unstable Housing in the Last Year: No           EXAMINATION:  Gen:   No apparent distress  Neuro:   A&Ox3, no deficits  Psych:    Answering questions appropriately for age and situation with normal affect  Head:    NCAT  Eye:    Visual scanning without deficit  Ear:    Response to auditory stimuli wnl  Lung:    Non-labored breathing on RA noted  Abd:    NTND per patient report  Lymph:    Neg for pitting/non-pitting edema BLE  Vasc:    Pulses palpable, CFT minimally delayed  Neuro:    Light touch sensation intact to all sensory nerve distributions without paresthesias  Derm:    Painful onychocryptosis lateral border right hallux.  There is no paronychia or signs of infection.  The nail is dystrophic  MSK:    ROM, strength wnl without limitation, no pain on palpation noted.  Calf:    Neg for redness, swelling or tenderness    Assessment:  43 year old female with symptomatic onychocryptosis lateral right hallux       Medical Decision Making/Plan:  Discussed etiologies, anatomy and options  1.  Symptomatic onychocryptosis lateral right hallux in setting of dystrophic nail  -I personally reviewed and interpreted the patient's lower extremity history pertinent to today's visit, including imaging/labs, in preparation for initiating a treatment program.  -As the patient is going on a cruise next week, I advised against a specific procedure.  A slant back was performed in hopes of alleviating symptoms.  She can soak, use ointment/Band-Aid and ibuprofen as needed based on inflammation or discomfort.  No indication for antibiotics today  -She will follow-up after her cruise for a procedure.  I recommend a partial permanent  avulsion.  She is in agreement today.    Follow up:  After cruise or sooner with acute issues      Patient's medical history was reviewed today      Josue Shoemaker DPM [facility]  Podiatric Foot & Ankle Surgeon  [facility]  [phone]    Disclaimer: This note consists of symbols derived from keyboarding, dictation and/or voice recognition software. As a result, there may be errors in the script that have gone undetected. Please consider this when interpreting information found in this chart.

## 2023-03-16 NOTE — LETTER
"    3/16/2023         RE: Diana Ferrera  [address]  [place] [address]        Dear Colleague,    Thank you for referring your patient, Diana Ferrera, to the [facility]. Please see a copy of my visit note below.    Foot & Ankle Surgery  2023    CC: \"Ingrown toenails\"    I was asked to see Diana Ferrera regarding the chief complaint by: Dr. Holman    HPI:  Pt is a 43 year old female who presents with above complaint.  20-year history of ingrown toenails bilateral lower extremity.  She states the lateral left hallux is the main problem.  She has been able to trim these on the past with success.  No previous procedures.  She describes \"shooting\", 3 out of 10 \"weekly\", worse with \"letting nails grow longer\".  \"Pedicures\" for treatment.  She is going on a cruise next week    ROS:   Pos for CC.  The patient denies current nausea, vomiting, chills, fevers, belly pain, calf pain, chest pain or SOB.  Complete remainder of ROS is otherwise neg.    VITALS:    Vitals:    23 1331   BP: 128/78   Weight: 79.4 kg (175 lb)   Height: 1.626 m (5' 4\")       PMH:    Past Medical History:   Diagnosis Date     Abnormal Pap smear 19, 12     Depressive disorder     I've gone off and on medication most of my adult life.     History of colposcopy 1995    12/15/08     Hypertension      Rash 2021    right side - wonder about shingles vs reaction to naproxen       SXHX:    Past Surgical History:   Procedure Laterality Date      SECTION  2014    Procedure:  SECTION;   Section;  Surgeon: Diana Lyons MD;  [facility]     FRACTURE SURGERY  age 5    factured both wrists roller blading     INSERT INTRAUTERINE DEVICE  2020    Mirena - also had one in [id number]     LEEP TX, CERVICAL  09        MEDS:    Current Outpatient Medications   Medication     amLODIPine (NORVASC) 5 MG tablet     " levonorgestrel (MIRENA) 20 MCG/24HR IUD     Multiple Vitamin (MULTI-VITAMIN PO)     Omega-3 Fatty Acids (FISH OIL) 500 MG CAPS     sertraline (ZOLOFT) 50 MG tablet     No current facility-administered medications for this visit.       ALL:   No Known Allergies    FMH:    Family History   Problem Relation Age of Onset     Diabetes Paternal Grandfather      Diabetes Maternal Grandfather      Diabetes Father      Myocardial Infarction Father      Hypertension Father        SocHx:    Social History     Socioeconomic History     Marital status:      Spouse name: Adama     Number of children: 1     Years of education: Not on file     Highest education level: Not on file   Occupational History     Occupation: manager     Comment:    Tobacco Use     Smoking status: Every Day     Packs/day: 1.00     Years: 26.00     Pack years: 26.00     Types: Cigarettes     Start date: 8/1/1994     Smokeless tobacco: Never     Tobacco comments:     I have quit two times in the past.   Vaping Use     Vaping Use: Never used   Substance and Sexual Activity     Alcohol use: Yes     Drug use: No     Sexual activity: Yes     Partners: Male     Birth control/protection: I.U.D.   Other Topics Concern     Parent/sibling w/ CABG, MI or angioplasty before 65F 55M? Yes     Comment: My dad   Social History Narrative     Not on file     Social Determinants of Health     Financial Resource Strain: Low Risk      Difficulty of Paying Living Expenses: Not hard at all   Food Insecurity: No Food Insecurity     Worried About Running Out of Food in the Last Year: Never true     Ran Out of Food in the Last Year: Never true   Transportation Needs: No Transportation Needs     Lack of Transportation (Medical): No     Lack of Transportation (Non-Medical): No   Physical Activity: Insufficiently Active     Days of Exercise per Week: 2 days     Minutes of Exercise per Session: 20 min   Stress: Stress Concern Present     Feeling of Stress : To  some extent   Social Connections: Socially Integrated     Frequency of Communication with Friends and Family: More than three times a week     Frequency of Social Gatherings with Friends and Family: Twice a week     Attends [employer] Services: 1 to 4 times per year     Active Member of Clubs or Organizations: Yes     Attends Club or Organization Meetings: Not on file     Marital Status:    Intimate Partner Violence: Not on file   Housing Stability: Low Risk      Unable to Pay for Housing in the Last Year: No     Number of Places Lived in the Last Year: 1     Unstable Housing in the Last Year: No           EXAMINATION:  Gen:   No apparent distress  Neuro:   A&Ox3, no deficits  Psych:    Answering questions appropriately for age and situation with normal affect  Head:    NCAT  Eye:    Visual scanning without deficit  Ear:    Response to auditory stimuli wnl  Lung:    Non-labored breathing on RA noted  Abd:    NTND per patient report  Lymph:    Neg for pitting/non-pitting edema BLE  Vasc:    Pulses palpable, CFT minimally delayed  Neuro:    Light touch sensation intact to all sensory nerve distributions without paresthesias  Derm:    Painful onychocryptosis lateral border right hallux.  There is no paronychia or signs of infection.  The nail is dystrophic  MSK:    ROM, strength wnl without limitation, no pain on palpation noted.  Calf:    Neg for redness, swelling or tenderness    Assessment:  43 year old female with symptomatic onychocryptosis lateral right hallux       Medical Decision Making/Plan:  Discussed etiologies, anatomy and options  1.  Symptomatic onychocryptosis lateral right hallux in setting of dystrophic nail  -I personally reviewed and interpreted the patient's lower extremity history pertinent to today's visit, including imaging/labs, in preparation for initiating a treatment program.  -As the patient is going on a cruise next week, I advised against a specific procedure.  A slant back was  performed in hopes of alleviating symptoms.  She can soak, use ointment/Band-Aid and ibuprofen as needed based on inflammation or discomfort.  No indication for antibiotics today  -She will follow-up after her cruise for a procedure.  I recommend a partial permanent avulsion.  She is in agreement today.    Follow up:  After cruise or sooner with acute issues      Patient's medical history was reviewed today      Josue Shoemaker DPM [facility]  Podiatric Foot & Ankle Surgeon  [facility]  [phone]    Disclaimer: This note consists of symbols derived from keyboarding, dictation and/or voice recognition software. As a result, there may be errors in the script that have gone undetected. Please consider this when interpreting information found in this chart.            Again, thank you for allowing me to participate in the care of your patient.        Sincerely,        Josue Shoemaker DPM, SANTOS

## 2023-03-30 LAB — NONINV COLON CA DNA+OCC BLD SCRN STL QL: NEGATIVE

## 2023-04-18 ENCOUNTER — OFFICE VISIT (OUTPATIENT)
Dept: PODIATRY | Facility: CLINIC | Age: 43
End: 2023-04-18
Payer: COMMERCIAL

## 2023-04-18 VITALS — BODY MASS INDEX: 30.04 KG/M2 | WEIGHT: 175 LBS | SYSTOLIC BLOOD PRESSURE: 136 MMHG | DIASTOLIC BLOOD PRESSURE: 90 MMHG

## 2023-04-18 DIAGNOSIS — L60.0 INGROWN NAIL: Primary | ICD-10-CM

## 2023-04-18 DIAGNOSIS — L60.0 ONYCHOCRYPTOSIS: ICD-10-CM

## 2023-04-18 PROCEDURE — 11750 EXCISION NAIL&NAIL MATRIX: CPT | Mod: T5 | Performed by: PODIATRIST

## 2023-04-18 RX ORDER — SILVER SULFADIAZINE 10 MG/G
CREAM TOPICAL
Qty: 50 G | Refills: 0 | Status: SHIPPED | OUTPATIENT
Start: 2023-04-18 | End: 2024-04-03

## 2023-04-18 NOTE — PROGRESS NOTES
Foot & Ankle Surgery   April 18, 2023    S:  Pt is seen today for evaluation of painful ingrown lateral right hallux.  I last saw her on 3/16/2023.  As she was going on a cruise shortly thereafter, I performed a slant back for symptom control and told her to follow-up after the cruise to consider partial permanent avulsion..    There were no vitals filed for this visit.'      ROS - Pos for CC.  Patient denies current nausea, vomiting, chills, fevers, belly pain, calf pain, chest pain or SOB.  Complete remainder of ROS it otherwise neg.      PE:  Gen:   No apparent distress  Eye:    Visual scanning without deficit  Ear:    Response to auditory stimuli wnl  Lung:    Non-labored breathing on RA noted  Abd:    NTND per patient report  Lymph:    Neg for pitting/non-pitting edema BLE  Vasc:    Pulses palpable, CFT minimally delayed  Neuro:    Light touch sensation intact to all sensory nerve distributions without paresthesias  Derm:    Previous slant back lateral right hallux.  No paronychia but tender with pressure  MSK:    ROM, strength wnl without limitation, no pain on palpation noted.  Calf:    Neg for redness, swelling or tenderness      Assessment:  43 year old female with symptomatic onychocryptosis lateral right hallux status post slant back 3/16/2023      Medical Decision Making/Plan:  Discussed etiologies, anatomy and options  1.  Symptomatic onychocryptosis lateral right hallux status post slant back 3/16/2023  -We discussed options.  Again, she would like to proceed with a partial permanent avulsion.  See procedure note for details.    Regarding the nail, treatment options were discussed.  They elected to proceed with a procedure, Partial permanent avulsion.  See procedure note for details.  Risks that were discussed include but are not limited to infection, wound healing complications, nerve irritation, recurrence of the ingrown nail and the need for further procedures.  Antibiotic:  None needed  -Rx for  Silvadene was sent to patient's pharmacy for twice-daily dressing changes  -patient was advised to follow up in 4 weeks for a 30 minute appointment if the wound has failed to progress/heal, and we'll proceed with an I&D; otherwise, follow up prn    After discussing the procedure, as well as risks, complications and post-procedure instructions, informed consent was obtained.    Anesthesia:  4 cc's of  1% lidocaine plain    Procedure:  After adequate prep, and with anesthesia achieved, a tourni-cot was applied to the involved toe(and removed after bandage application) and  attention was directed to the lateral border of the R hallux where the nail plate was freed from surrounding soft tissue.  The offending border was  using an English Anvil and then removed in total.  The base of the wound was explored and showed no necrotic tissue, purulence or debris.  Phenol was then applied to the base of the wound for 30s x 3, and sufficient isopropyl alcohol was used to irrigate the wound.  The base of the wound/ nail matrix was curetted after each acid application.  A clean dressing was applied loosely to prevent vascular insult.  The patient tolerated the procedure well without complications.    Post-procedural instructions were dispensed and discussed with the patient.  All questions were answered.          Follow up:  prn or sooner with acute issues           Josue Shoemaker DPM [facility] FACFAOM  Podiatric Foot & Ankle Surgeon  [facility]  [phone]    Disclaimer: This note consists of symbols derived from keyboarding, dictation and/or voice recognition software. As a result, there may be errors in the script that have gone undetected. Please consider this when interpreting information found in this chart.

## 2023-04-18 NOTE — PATIENT INSTRUCTIONS
"Thank you for choosing [facility] Podiatry / Foot & Ankle Surgery!    DR. HILLIARD'S CLINIC LOCATIONS:     [facility] (Friday) TRIAGE LINE: [phone] [address] [address]  APPOINTMENTS: [phone]   [place] [address] RADIOLOGY: [phone]    PHYSICAL THERAPY: [phone]    SET UP SURGERY: [phone]   [place] (Mon-Tues AM-Thurs) BILLING QUESTIONS: [phone] [address]  [address] FAX: [phone]   [place] [address]            Ingrown toenail Post-procedural instructions    - After the procedure, go home and elevate the involved foot for the remainder of the day/evening as able.  This is to minimize swelling, control pain, and limit post-procedural complications.  The pre-procedural injection may cause your toe to be numb anywhere from1-2 hours.    - You can take Tylenol, Ibuprofen, Advil, etc as needed for pain if tolerated.  Follow label instructions      - If you have been given a prescription for antibiotics, take them as instructed and complete the prescription.    - Keep dressing intact until the following morning.  At that point, you may remove the bandage (you may need to soak it in warm soapy water as the bandage will likely adhere to your skin).  Soaking in warm soapy water for 5-10 minutes will also facilitate healing.  Wash the toe thoroughly, dry the toe thoroughly.  Apply antibiotic wound ointment to base of wound and cover with 2x2 gauze pads and Coban dressing (not too tightly) until it stops draining(you'll need to purchase the 2x2 gauze pads and 1\" Coban rolls, especially if you had the permanent/chemical procedure performed).  This may take a few days to weeks, but at that point, you may continue with antibiotic ointment and a band-aid, or you may stop applying a dressing all together.  Dressing changes and soaks should be done twice daily(morning/evening) if you had the permanent/chemical procedure done.      -If you had the permanent procedure done, " assess the toe at 4 weeks out from the procedure.  If the toe continues to heal/improve, continue twice-daily soaks and dressing changes, and on follow up is needed.  If the toe is no longer showing improvement at 4 weeks out from the procedure, follow up in clinic for a 30 minute appointment, and we'll clean out the procedure site.    - You may do activities to tolerance the following day.  Find a shoe that is comfortable and minimizes the amount of rubbing on your toe, as this may increase pain, swelling, etc.  Utilize resting, icing, elevating and anti-inflammatories/Tylenol as needed.    - Monitor for signs of infection.  With this procedure, it is common to have mild surrounding redness and drainage.  If the redness involves the entire great toe or if you notice red streaks on top of your foot, or if you experience any nausea, vomiting, chills, fevers > 101 degrees, call clinic for a quick appointment.

## 2023-04-27 DIAGNOSIS — F41.9 ANXIETY: ICD-10-CM

## 2023-04-28 NOTE — TELEPHONE ENCOUNTER
Duplicate/patient should have refills available, year supply sent to requesting pharmacy on 3/8/23.     Lalita HUGHES RN, BSN, PHN  [facility]  [phone]

## 2023-05-04 ENCOUNTER — HOSPITAL ENCOUNTER (OUTPATIENT)
Dept: CT IMAGING | Facility: CLINIC | Age: 43
Discharge: HOME | End: 2023-05-04
Attending: FAMILY MEDICINE | Admitting: FAMILY MEDICINE
Payer: COMMERCIAL

## 2023-05-04 DIAGNOSIS — Z82.49 FAMILY HISTORY OF ISCHEMIC HEART DISEASE: ICD-10-CM

## 2023-05-04 PROBLEM — K76.0 FATTY LIVER: Status: ACTIVE | Noted: 2023-05-01

## 2023-05-04 PROCEDURE — 75571 CT HRT W/O DYE W/CA TEST: CPT | Mod: 26 | Performed by: INTERNAL MEDICINE

## 2023-05-04 PROCEDURE — 75571 CT HRT W/O DYE W/CA TEST: CPT

## 2023-11-06 ENCOUNTER — OFFICE VISIT (OUTPATIENT)
Dept: PODIATRY | Facility: CLINIC | Age: 43
End: 2023-11-06
Payer: COMMERCIAL

## 2023-11-06 VITALS
DIASTOLIC BLOOD PRESSURE: 87 MMHG | BODY MASS INDEX: 28.66 KG/M2 | HEART RATE: 88 BPM | SYSTOLIC BLOOD PRESSURE: 151 MMHG | WEIGHT: 172 LBS | HEIGHT: 65 IN

## 2023-11-06 DIAGNOSIS — L60.0 INGROWN NAIL: Primary | ICD-10-CM

## 2023-11-06 PROCEDURE — 99213 OFFICE O/P EST LOW 20 MIN: CPT | Mod: 25 | Performed by: PODIATRIST

## 2023-11-06 PROCEDURE — 11750 EXCISION NAIL&NAIL MATRIX: CPT | Mod: TA | Performed by: PODIATRIST

## 2023-11-06 NOTE — LETTER
"    11/6/2023         RE: Diana Ferrera  [address]  [facility] [place] [facility] [address]        Dear Colleague,    Thank you for referring your patient, Diana Ferrera, to the [facility]. Please see a copy of my visit note below.    Foot & Ankle Surgery   November 6, 2023    S:  Pt is seen today for evaluation of painful ingrown nail medial left hallux.  She previously had the lateral right hallux removed permanently by myself on 4/18/2023 and was happy with the outcome..    Vitals:    11/06/23 0846   BP: (!) 151/87   Pulse: 88   Weight: 78 kg (172 lb)   Height: 1.651 m (5' 5\")   '      ROS - Pos for CC.  Patient denies current nausea, vomiting, chills, fevers, belly pain, calf pain, chest pain or SOB.  Complete remainder of ROS it otherwise neg.      PE:  Gen:   No apparent distress  Eye:    Visual scanning without deficit  Ear:    Response to auditory stimuli wnl  Lung:    Non-labored breathing on RA noted  Abd:    NTND per patient report  Lymph:    Neg for pitting/non-pitting edema BLE  Vasc:    Pulses palpable, CFT minimally delayed  Neuro:    Light touch sensation intact to all sensory nerve distributions without paresthesias  Derm:    Onychocryptosis bilateral border left hallux.  She is tender medially without inflammation or infection  MSK:    ROM, strength wnl without limitation, no pain on palpation noted.  Calf:    Neg for redness, swelling or tenderness    Assessment:  43 year old female with symptomatic onychocryptosis medial left hallux      Medical Decision Making/Plan:  Discussed etiologies, anatomy and options  1.  Symptomatic onychocryptosis medial left hallux  -Regarding the nail, treatment options were discussed.  They elected to proceed with a procedure, Partial permanent avulsion.  See procedure note for details.  Risks that were discussed include but are not limited to infection, wound healing complications, nerve irritation, recurrence of the ingrown nail and the need " for further procedures.  Antibiotic:  none needed  -She has enough Silvadene from the previous prescription.  She will follow-up in 4 weeks if this has failed to heal for 30-minute appointment and we will consider an I&D.  Otherwise, no follow-up is needed    After discussing the procedure, as well as risks, complications and post-procedure instructions, informed consent was obtained.    Anesthesia:  4 cc's of  1% lidocaine plain    Procedure:  After adequate prep, and with anesthesia achieved, a tourni-cot was applied to the involved toe(and removed after bandage application) and  attention was directed to the medial border of the L hallux where the nail plate was freed from surrounding soft tissue.  The offending border was  using an English Anvil and then removed in total.  The base of the wound was explored and showed no necrotic tissue, purulence or debris.  Phenol was then applied to the base of the wound for 30s x 3, and sufficient isopropyl alcohol was used to irrigate the wound.  The base of the wound/ nail matrix was curetted after each acid application.  A clean dressing was applied loosely to prevent vascular insult.  The patient tolerated the procedure well without complications.    Post-procedural instructions were dispensed and discussed with the patient.  All questions were answered.       Follow up:  prn or sooner with acute issues           Josue Shoemaker DPM FACFAS FACFAOM  Podiatric Foot & Ankle Surgeon  [facility]  [phone]    Disclaimer: This note consists of symbols derived from keyboarding, dictation and/or voice recognition software. As a result, there may be errors in the script that have gone undetected. Please consider this when interpreting information found in this chart.        Again, thank you for allowing me to participate in the care of your patient.        Sincerely,        Josue Shoemaker DPM, SANTOS

## 2023-11-06 NOTE — PATIENT INSTRUCTIONS
"Thank you for choosing [facility] Podiatry / Foot & Ankle Surgery!    DR. HILLIARD'S CLINIC LOCATIONS:     [facility] (Friday) TRIAGE LINE: [phone] [address] [facility] [facility]  APPOINTMENTS: [phone]   [place] [address] RADIOLOGY: [phone]    PHYSICAL THERAPY: [phone]    SET UP SURGERY: [phone]   [place] (Mon-Tues AM-Thurs) BILLING QUESTIONS: [phone] [address]  [address] FAX: [phone]   [place] [address] [place] [facility]: [phone]      Ingrown toenail Post-procedural instructions    - After the procedure, go home and elevate the involved foot for the remainder of the day/evening as able.  This is to minimize swelling, control pain, and limit post-procedural complications.  The pre-procedural injection may cause your toe to be numb anywhere from1-2 hours.    - You can take Tylenol, Ibuprofen, Advil, etc as needed for pain if tolerated.  Follow label instructions      - If you have been given a prescription for antibiotics, take them as instructed and complete the prescription.    - Keep dressing intact until the following morning.  At that point, you may remove the bandage (you may need to soak it in warm soapy water as the bandage will likely adhere to your skin).  Soaking in warm soapy water for 5-10 minutes will also facilitate healing.  Wash the toe thoroughly, dry the toe thoroughly.  Apply antibiotic wound ointment to base of wound and cover with 2x2 gauze pads and Coban dressing (not too tightly) until it stops draining(you'll need to purchase the 2x2 gauze pads and 1\" Coban rolls, especially if you had the permanent/chemical procedure performed).  This may take a few days to weeks, but at that point, you may continue with antibiotic ointment and a band-aid, or you may stop applying a dressing all together.  Dressing changes and soaks should be done twice daily(morning/evening) if you had the permanent/chemical procedure done.      -If you had the " permanent procedure done, assess the toe at 4 weeks out from the procedure.  If the toe continues to heal/improve, continue twice-daily soaks and dressing changes, and on follow up is needed.  If the toe is no longer showing improvement at 4 weeks out from the procedure, follow up in clinic for a 30 minute appointment, and we'll clean out the procedure site.    - You may do activities to tolerance the following day.  Find a shoe that is comfortable and minimizes the amount of rubbing on your toe, as this may increase pain, swelling, etc.  Utilize resting, icing, elevating and anti-inflammatories/Tylenol as needed.    - Monitor for signs of infection.  With this procedure, it is common to have mild surrounding redness and drainage.  If the redness involves the entire great toe or if you notice red streaks on top of your foot, or if you experience any nausea, vomiting, chills, fevers > 101 degrees, call clinic for a quick appointment.

## 2023-11-06 NOTE — PROGRESS NOTES
"Foot & Ankle Surgery   November 6, 2023    S:  Pt is seen today for evaluation of painful ingrown nail medial left hallux.  She previously had the lateral right hallux removed permanently by myself on 4/18/2023 and was happy with the outcome..    Vitals:    11/06/23 0846   BP: (!) 151/87   Pulse: 88   Weight: 78 kg (172 lb)   Height: 1.651 m (5' 5\")   '      ROS - Pos for CC.  Patient denies current nausea, vomiting, chills, fevers, belly pain, calf pain, chest pain or SOB.  Complete remainder of ROS it otherwise neg.      PE:  Gen:   No apparent distress  Eye:    Visual scanning without deficit  Ear:    Response to auditory stimuli wnl  Lung:    Non-labored breathing on RA noted  Abd:    NTND per patient report  Lymph:    Neg for pitting/non-pitting edema BLE  Vasc:    Pulses palpable, CFT minimally delayed  Neuro:    Light touch sensation intact to all sensory nerve distributions without paresthesias  Derm:    Onychocryptosis bilateral border left hallux.  She is tender medially without inflammation or infection  MSK:    ROM, strength wnl without limitation, no pain on palpation noted.  Calf:    Neg for redness, swelling or tenderness    Assessment:  43 year old female with symptomatic onychocryptosis medial left hallux      Medical Decision Making/Plan:  Discussed etiologies, anatomy and options  1.  Symptomatic onychocryptosis medial left hallux  -Regarding the nail, treatment options were discussed.  They elected to proceed with a procedure, Partial permanent avulsion.  See procedure note for details.  Risks that were discussed include but are not limited to infection, wound healing complications, nerve irritation, recurrence of the ingrown nail and the need for further procedures.  Antibiotic:  none needed  -She has enough Silvadene from the previous prescription.  She will follow-up in 4 weeks if this has failed to heal for 30-minute appointment and we will consider an I&D.  Otherwise, no follow-up is " needed    After discussing the procedure, as well as risks, complications and post-procedure instructions, informed consent was obtained.    Anesthesia:  4 cc's of  1% lidocaine plain    Procedure:  After adequate prep, and with anesthesia achieved, a tourni-cot was applied to the involved toe(and removed after bandage application) and  attention was directed to the medial border of the L hallux where the nail plate was freed from surrounding soft tissue.  The offending border was  using an English Anvil and then removed in total.  The base of the wound was explored and showed no necrotic tissue, purulence or debris.  Phenol was then applied to the base of the wound for 30s x 3, and sufficient isopropyl alcohol was used to irrigate the wound.  The base of the wound/ nail matrix was curetted after each acid application.  A clean dressing was applied loosely to prevent vascular insult.  The patient tolerated the procedure well without complications.    Post-procedural instructions were dispensed and discussed with the patient.  All questions were answered.       Follow up:  prn or sooner with acute issues           Josue Shoemaker DPM [facility] FACFAOM  Podiatric Foot & Ankle Surgeon  [facility]  [phone]    Disclaimer: This note consists of symbols derived from keyboarding, dictation and/or voice recognition software. As a result, there may be errors in the script that have gone undetected. Please consider this when interpreting information found in this chart.

## 2023-12-14 ENCOUNTER — PATIENT OUTREACH (OUTPATIENT)
Dept: CARE COORDINATION | Facility: CLINIC | Age: 43
End: 2023-12-14
Payer: COMMERCIAL

## 2024-01-11 ENCOUNTER — PATIENT OUTREACH (OUTPATIENT)
Dept: CARE COORDINATION | Facility: CLINIC | Age: 44
End: 2024-01-11
Payer: COMMERCIAL

## 2024-01-19 ENCOUNTER — ANCILLARY PROCEDURE (OUTPATIENT)
Dept: MAMMOGRAPHY | Facility: CLINIC | Age: 44
End: 2024-01-19
Attending: FAMILY MEDICINE
Payer: COMMERCIAL

## 2024-01-19 DIAGNOSIS — Z12.31 VISIT FOR SCREENING MAMMOGRAM: ICD-10-CM

## 2024-01-19 PROCEDURE — 77063 BREAST TOMOSYNTHESIS BI: CPT | Mod: TC | Performed by: RADIOLOGY

## 2024-01-19 PROCEDURE — 77067 SCR MAMMO BI INCL CAD: CPT | Mod: TC | Performed by: RADIOLOGY

## 2024-01-22 ENCOUNTER — OFFICE VISIT (OUTPATIENT)
Dept: URGENT CARE | Facility: URGENT CARE | Age: 44
End: 2024-01-22
Payer: COMMERCIAL

## 2024-01-22 VITALS
SYSTOLIC BLOOD PRESSURE: 165 MMHG | DIASTOLIC BLOOD PRESSURE: 93 MMHG | BODY MASS INDEX: 29.69 KG/M2 | TEMPERATURE: 97.9 F | WEIGHT: 178.4 LBS | HEART RATE: 85 BPM | OXYGEN SATURATION: 97 % | RESPIRATION RATE: 16 BRPM

## 2024-01-22 DIAGNOSIS — R00.2 PALPITATIONS: Primary | ICD-10-CM

## 2024-01-22 DIAGNOSIS — I10 BENIGN ESSENTIAL HYPERTENSION: ICD-10-CM

## 2024-01-22 LAB
BASOPHILS # BLD AUTO: 0 10E3/UL (ref 0–0.2)
BASOPHILS NFR BLD AUTO: 0 %
EOSINOPHIL # BLD AUTO: 0.4 10E3/UL (ref 0–0.7)
EOSINOPHIL NFR BLD AUTO: 4 %
ERYTHROCYTE [DISTWIDTH] IN BLOOD BY AUTOMATED COUNT: 12.6 % (ref 10–15)
ERYTHROCYTE [SEDIMENTATION RATE] IN BLOOD BY WESTERGREN METHOD: 10 MM/HR (ref 0–20)
HCT VFR BLD AUTO: 47.9 % (ref 35–47)
HGB BLD-MCNC: 15.9 G/DL (ref 11.7–15.7)
IMM GRANULOCYTES # BLD: 0 10E3/UL
IMM GRANULOCYTES NFR BLD: 0 %
LYMPHOCYTES # BLD AUTO: 2.6 10E3/UL (ref 0.8–5.3)
LYMPHOCYTES NFR BLD AUTO: 27 %
MCH RBC QN AUTO: 32.8 PG (ref 26.5–33)
MCHC RBC AUTO-ENTMCNC: 33.2 G/DL (ref 31.5–36.5)
MCV RBC AUTO: 99 FL (ref 78–100)
MONOCYTES # BLD AUTO: 0.7 10E3/UL (ref 0–1.3)
MONOCYTES NFR BLD AUTO: 7 %
NEUTROPHILS # BLD AUTO: 6 10E3/UL (ref 1.6–8.3)
NEUTROPHILS NFR BLD AUTO: 62 %
PLATELET # BLD AUTO: 234 10E3/UL (ref 150–450)
RBC # BLD AUTO: 4.85 10E6/UL (ref 3.8–5.2)
WBC # BLD AUTO: 9.7 10E3/UL (ref 4–11)

## 2024-01-22 PROCEDURE — 85652 RBC SED RATE AUTOMATED: CPT | Performed by: PHYSICIAN ASSISTANT

## 2024-01-22 PROCEDURE — 36415 COLL VENOUS BLD VENIPUNCTURE: CPT | Performed by: PHYSICIAN ASSISTANT

## 2024-01-22 PROCEDURE — 80048 BASIC METABOLIC PNL TOTAL CA: CPT | Performed by: PHYSICIAN ASSISTANT

## 2024-01-22 PROCEDURE — 84443 ASSAY THYROID STIM HORMONE: CPT | Performed by: PHYSICIAN ASSISTANT

## 2024-01-22 PROCEDURE — 85025 COMPLETE CBC W/AUTO DIFF WBC: CPT | Performed by: PHYSICIAN ASSISTANT

## 2024-01-22 PROCEDURE — 99214 OFFICE O/P EST MOD 30 MIN: CPT | Mod: 25 | Performed by: PHYSICIAN ASSISTANT

## 2024-01-22 PROCEDURE — 93000 ELECTROCARDIOGRAM COMPLETE: CPT | Performed by: PHYSICIAN ASSISTANT

## 2024-01-22 RX ORDER — HYDROCHLOROTHIAZIDE 12.5 MG/1
12.5 CAPSULE ORAL DAILY
Qty: 60 CAPSULE | Refills: 0 | Status: SHIPPED | OUTPATIENT
Start: 2024-01-22 | End: 2024-04-03

## 2024-01-22 ASSESSMENT — ENCOUNTER SYMPTOMS
SHORTNESS OF BREATH: 0
VOMITING: 0
PALPITATIONS: 1
FEVER: 0
CHEST TIGHTNESS: 1
SORE THROAT: 0
COUGH: 0
ABDOMINAL PAIN: 0
RHINORRHEA: 0

## 2024-01-23 LAB
ANION GAP SERPL CALCULATED.3IONS-SCNC: 14 MMOL/L (ref 7–15)
BUN SERPL-MCNC: 12.8 MG/DL (ref 6–20)
CALCIUM SERPL-MCNC: 9.6 MG/DL (ref 8.6–10)
CHLORIDE SERPL-SCNC: 101 MMOL/L (ref 98–107)
CREAT SERPL-MCNC: 0.66 MG/DL (ref 0.51–0.95)
DEPRECATED HCO3 PLAS-SCNC: 25 MMOL/L (ref 22–29)
EGFRCR SERPLBLD CKD-EPI 2021: >90 ML/MIN/1.73M2
GLUCOSE SERPL-MCNC: 89 MG/DL (ref 70–99)
POTASSIUM SERPL-SCNC: 4.2 MMOL/L (ref 3.4–5.3)
SODIUM SERPL-SCNC: 140 MMOL/L (ref 135–145)
TSH SERPL DL<=0.005 MIU/L-ACNC: 3.95 UIU/ML (ref 0.3–4.2)

## 2024-01-23 NOTE — PROGRESS NOTES
Assessment & Plan:        ICD-10-CM    1. Palpitations  R00.2 EKG 12-lead complete w/read - Clinics     CBC with platelets and differential     ESR: Erythrocyte sedimentation rate     Basic metabolic panel  (Ca, Cl, CO2, Creat, Gluc, K, Na, BUN)     TSH with free T4 reflex     CBC with platelets and differential     ESR: Erythrocyte sedimentation rate     Basic metabolic panel  (Ca, Cl, CO2, Creat, Gluc, K, Na, BUN)     TSH with free T4 reflex      2. Benign essential hypertension  I10 hydrochlorothiazide (MICROZIDE) 12.5 MG capsule            Plan/Clinical Decision Makin) Patient presents with several days of chest pressure, palpitations. FH of early ischemic heart disease. She has had normal Calcium CT. She also notes left fingers more purplish past month. Intermittent. Cold can effect symptoms, noticing some improvement. Has had a bit of stress and some grief. Normal exam.   EKG: Normal sinus rhythm.   Labs obtained TSH, CBC, Sed rate, TSH, results pending.   Consider Zio-patch if persistent palpitations. Suspect some symptoms due to stress.     2) HTN  Not under ideal control.   Currently on amlodipine. Will add hydrochlorothiazide today.   Does have bilateral trace edema in extremities. Did review amlodipine has side effect of swelling.   Can further discuss with PCP. Did discuss if she has Raynaud's amlodipine does help with that.       Return if symptoms worsen or fail to improve, for in 5-7 days.     At the end of the encounter, I discussed results, diagnosis, medications. Discussed red flags for immediate return to clinic/ER, as well as indications for follow up if no improvement. Patient understood and agreed to plan. Patient was stable for discharge.        Marilia Robertson PA-C on 2024 at 6:30 PM          Subjective:     HPI:    Diana is a 43 year old female who presents to clinic today for the following health issues:  Chief Complaint   Patient presents with    Chest Pain     Start a  Subjective:  Rigo Gunter  is a 1 year old  male  who comes in today with a 2 days history of moderate pain in the  right ear(s) and tugging at the  right ear(s).  He  does have a previous history of otitis media.  Other symptoms at this time include :finished amox for ear infection bilatearl few days ago but mother thinks it did not help.  denies any respiratory distress;  denies any gastrointestinal symptoms;  denies any genitourinary symptoms;  denies any neurologic symptoms;      Pmhx;none    Social History     Social History   • Marital status: Single     Spouse name: N/A   • Number of children: N/A   • Years of education: N/A     Occupational History   • Not on file.     Social History Main Topics   • Smoking status: No smokers at home     Objective:    Visit Vitals  Pulse 130   Temp 99.9 °F (37.7 °C)   Resp 20   Wt 13.9 kg   SpO2 97%       PHYSICAL EXAM:    General: Alert, in no acute distress.  Skin: Warm with normal turgor.  Head: Atraumatic and normocephalic.  Eyes: Eyelids and conjunctiva appear normal, no excessive tearing or discharge; eomi, perrla;  Ears: R - TM injected and bulging         L - TM nl  Nose: No flaring or discharge present.  Throat: Oropharynx clear, no redness or exudate present.  Neck: Supple with no significant adenopathy; negative meningeal signs.  Lungs: Clear to auscultation, no wheezing or rhonchi noted.  Heart: Regular rhythm, no murmur present.    Assessment:  (H66.001) Acute suppurative otitis media of right ear without spontaneous rupture of tympanic membrane, recurrence not specified  (primary encounter diagnosis)  Plan:  The findings were explained to the patient/parent.    Med as ordered    Pain will be controlled with OTC analgesics. >If symptoms are not improved in 3 days or if they worsen should recheck immediately either with pmd/wic.  In case of worsening fever or poor feeding or  decreased urinary output or becoming lethargic, vomiting or any photophobia or neck  couple of days ago sx chest pressure, palpitations, fingers tips on left hand turning purple hx costochondritis, paternal had cardio conditions stress and new job tx normal      HPI    Patient under a lot of stress and fingers turned purple on left hand a month ago. Comes and goes, gets worse with cold.  Initially had numbness, but that is better. Intermittently having some mid chest pressure and palpitations. No left arm pain, no radiating pain. No GI symptoms. No cough, fever or illness. No GI symptoms. No SOB, no dyspnea, no orthopnea, no increase symptom with exertion.   Patient had costochondritis intermittently. Muscle relaxers have helped in past.   Has FH of ischemic heart disease- father-first MI at 48.     Patient has hx of HTN, treated with amlodipine. Elevated today.     BP Readings from Last 6 Encounters:   01/22/24 (!) 165/93   11/06/23 (!) 151/87   04/18/23 (!) 136/90   03/16/23 128/78   03/08/23 134/83   03/30/22 130/84        Has a bit of stress lately.   Patient has had CT calcium score this year:     CORONARY ARTERY CALCIUM SCORES:   Total calcium score: 0  Left main coronary artery: 0  Left anterior descending coronary artery: 0  Circumflex coronary artery: 0  Right coronary artery: 0    Review of Systems   Constitutional:  Negative for fever.   HENT:  Negative for congestion, rhinorrhea and sore throat.    Respiratory:  Positive for chest tightness (intermittently). Negative for cough and shortness of breath.    Cardiovascular:  Positive for palpitations and leg swelling (chronic trace edema). Negative for chest pain.   Gastrointestinal:  Negative for abdominal pain and vomiting.         Patient Active Problem List   Diagnosis    fetal abnormality, antepartum    H/O LEEP    Anxiety    Benign essential hypertension    YAO III (cervical intraepithelial neoplasia III)    Esophageal reflux    IUD (intrauterine device) in place    Tobacco use disorder    Fatty liver        Past Medical History:  stiffness or severe headaches, or worsening abdominal pain, asked parent to report to ER immediately or seek medical attention.         Diagnosis Date    Abnormal Pap smear 1995 11/26/08, 4/16/12    Depressive disorder 1995    I've gone off and on medication most of my adult life.    Fatty liver 05/2023    History of colposcopy 1995    12/15/08    Hypertension     Rash 12/2021    right side - wonder about shingles vs reaction to naproxen       Social History     Tobacco Use    Smoking status: Every Day     Packs/day: 1.00     Years: 26.00     Additional pack years: 0.00     Total pack years: 26.00     Types: Cigarettes     Start date: 8/1/1994    Smokeless tobacco: Never    Tobacco comments:     I have quit two times in the past.   Substance Use Topics    Alcohol use: Yes             Objective:     Vitals:    01/22/24 1753   BP: (!) 165/93   Pulse: 85   Resp: 16   Temp: 97.9  F (36.6  C)   SpO2: 97%   Weight: 80.9 kg (178 lb 6.4 oz)         Physical Exam   EXAM:   Pleasant, alert, appropriate appearance. NAD.  Head Exam: Normocephalic, atraumatic.  Eye Exam:  PERRLA, EOMI, non icteric/injection.    Ear Exam: TMs grey without bulging. Normal canals.  Normal pinna.  Nose Exam: Normal external nose.    OroPharynx Exam:  Moist mucous membranes. No erythema, pharynx without exudate or hypertrophy.  Neck/Thyroid Exam:  No LAD.  No nodules or enlargement.  Chest/Respiratory Exam: CTAB.  Cardiovascular Exam: RRR. No murmur or rubs.  Ext/musculoskeletal: Grossly intact, tace edema bilaterally.  Neuro: CN II-XII intact grossly intact.  Skin: no rash or lesion. Bilateral finger tips purplish,nl cap refill.       Results:  Results for orders placed or performed in visit on 01/22/24   ESR: Erythrocyte sedimentation rate     Status: Normal   Result Value Ref Range    Erythrocyte Sedimentation Rate 10 0 - 20 mm/hr   Basic metabolic panel  (Ca, Cl, CO2, Creat, Gluc, K, Na, BUN)     Status: Normal   Result Value Ref Range    Sodium 140 135 - 145 mmol/L    Potassium 4.2 3.4 - 5.3 mmol/L    Chloride 101 98 - 107 mmol/L    Carbon Dioxide (CO2) 25 22 - 29 mmol/L     Anion Gap 14 7 - 15 mmol/L    Urea Nitrogen 12.8 6.0 - 20.0 mg/dL    Creatinine 0.66 0.51 - 0.95 mg/dL    GFR Estimate >90 >60 mL/min/1.73m2    Calcium 9.6 8.6 - 10.0 mg/dL    Glucose 89 70 - 99 mg/dL   TSH with free T4 reflex     Status: Normal   Result Value Ref Range    TSH 3.95 0.30 - 4.20 uIU/mL   CBC with platelets and differential     Status: Abnormal   Result Value Ref Range    WBC Count 9.7 4.0 - 11.0 10e3/uL    RBC Count 4.85 3.80 - 5.20 10e6/uL    Hemoglobin 15.9 (H) 11.7 - 15.7 g/dL    Hematocrit 47.9 (H) 35.0 - 47.0 %    MCV 99 78 - 100 fL    MCH 32.8 26.5 - 33.0 pg    MCHC 33.2 31.5 - 36.5 g/dL    RDW 12.6 10.0 - 15.0 %    Platelet Count 234 150 - 450 10e3/uL    % Neutrophils 62 %    % Lymphocytes 27 %    % Monocytes 7 %    % Eosinophils 4 %    % Basophils 0 %    % Immature Granulocytes 0 %    Absolute Neutrophils 6.0 1.6 - 8.3 10e3/uL    Absolute Lymphocytes 2.6 0.8 - 5.3 10e3/uL    Absolute Monocytes 0.7 0.0 - 1.3 10e3/uL    Absolute Eosinophils 0.4 0.0 - 0.7 10e3/uL    Absolute Basophils 0.0 0.0 - 0.2 10e3/uL    Absolute Immature Granulocytes 0.0 <=0.4 10e3/uL   CBC with platelets and differential     Status: Abnormal    Narrative    The following orders were created for panel order CBC with platelets and differential.  Procedure                               Abnormality         Status                     ---------                               -----------         ------                     CBC with platelets and d...[[id number]]  Abnormal            Final result                 Please view results for these tests on the individual orders.

## 2024-04-01 SDOH — HEALTH STABILITY: PHYSICAL HEALTH: ON AVERAGE, HOW MANY MINUTES DO YOU ENGAGE IN EXERCISE AT THIS LEVEL?: 30 MIN

## 2024-04-01 SDOH — HEALTH STABILITY: PHYSICAL HEALTH: ON AVERAGE, HOW MANY DAYS PER WEEK DO YOU ENGAGE IN MODERATE TO STRENUOUS EXERCISE (LIKE A BRISK WALK)?: 2 DAYS

## 2024-04-01 ASSESSMENT — SOCIAL DETERMINANTS OF HEALTH (SDOH)
IN A TYPICAL WEEK, HOW MANY TIMES DO YOU TALK ON THE PHONE WITH FAMILY, FRIENDS, OR NEIGHBORS?: MORE THAN THREE TIMES A WEEK
HOW OFTEN DO YOU ATTENT MEETINGS OF THE CLUB OR ORGANIZATION YOU BELONG TO?: NEVER
HOW OFTEN DO YOU GET TOGETHER WITH FRIENDS OR RELATIVES?: TWICE A WEEK
DO YOU BELONG TO ANY CLUBS OR ORGANIZATIONS SUCH AS CHURCH GROUPS UNIONS, FRATERNAL OR ATHLETIC GROUPS, OR SCHOOL GROUPS?: NO
HOW OFTEN DO YOU GET TOGETHER WITH FRIENDS OR RELATIVES?: TWICE A WEEK
HOW OFTEN DO YOU ATTEND CHURCH OR RELIGIOUS SERVICES?: 1 TO 4 TIMES PER YEAR

## 2024-04-01 ASSESSMENT — LIFESTYLE VARIABLES
AUDIT-C TOTAL SCORE: 6
HOW OFTEN DO YOU HAVE SIX OR MORE DRINKS ON ONE OCCASION: MONTHLY
HOW MANY STANDARD DRINKS CONTAINING ALCOHOL DO YOU HAVE ON A TYPICAL DAY: 1 OR 2
SKIP TO QUESTIONS 9-10: 0
HOW OFTEN DO YOU HAVE A DRINK CONTAINING ALCOHOL: 4 OR MORE TIMES A WEEK

## 2024-04-03 ENCOUNTER — OFFICE VISIT (OUTPATIENT)
Dept: FAMILY MEDICINE | Facility: CLINIC | Age: 44
End: 2024-04-03
Attending: FAMILY MEDICINE
Payer: COMMERCIAL

## 2024-04-03 VITALS
BODY MASS INDEX: 29.32 KG/M2 | OXYGEN SATURATION: 99 % | SYSTOLIC BLOOD PRESSURE: 136 MMHG | WEIGHT: 176 LBS | RESPIRATION RATE: 16 BRPM | TEMPERATURE: 98.1 F | DIASTOLIC BLOOD PRESSURE: 85 MMHG | HEIGHT: 65 IN | HEART RATE: 74 BPM

## 2024-04-03 DIAGNOSIS — I10 BENIGN ESSENTIAL HYPERTENSION: ICD-10-CM

## 2024-04-03 DIAGNOSIS — Z83.3 FAMILY HISTORY OF DIABETES MELLITUS: ICD-10-CM

## 2024-04-03 DIAGNOSIS — M25.511 ACUTE PAIN OF RIGHT SHOULDER: ICD-10-CM

## 2024-04-03 DIAGNOSIS — I73.00 RAYNAUD'S DISEASE WITHOUT GANGRENE: ICD-10-CM

## 2024-04-03 DIAGNOSIS — Z82.49 FAMILY HISTORY OF ISCHEMIC HEART DISEASE: ICD-10-CM

## 2024-04-03 DIAGNOSIS — K76.0 FATTY LIVER: ICD-10-CM

## 2024-04-03 DIAGNOSIS — F17.200 NICOTINE DEPENDENCE, UNCOMPLICATED, UNSPECIFIED NICOTINE PRODUCT TYPE: ICD-10-CM

## 2024-04-03 DIAGNOSIS — Z00.00 ROUTINE GENERAL MEDICAL EXAMINATION AT A HEALTH CARE FACILITY: Primary | ICD-10-CM

## 2024-04-03 DIAGNOSIS — F41.9 ANXIETY: ICD-10-CM

## 2024-04-03 DIAGNOSIS — Z13.6 CARDIOVASCULAR SCREENING; LDL GOAL LESS THAN 130: ICD-10-CM

## 2024-04-03 LAB
ALBUMIN SERPL BCG-MCNC: 4.6 G/DL (ref 3.5–5.2)
ALP SERPL-CCNC: 65 U/L (ref 40–150)
ALT SERPL W P-5'-P-CCNC: 30 U/L (ref 0–50)
AST SERPL W P-5'-P-CCNC: 25 U/L (ref 0–45)
BILIRUB DIRECT SERPL-MCNC: <0.2 MG/DL (ref 0–0.3)
BILIRUB SERPL-MCNC: 0.6 MG/DL
CHOLEST SERPL-MCNC: 256 MG/DL
CREAT UR-MCNC: 131 MG/DL
ERYTHROCYTE [DISTWIDTH] IN BLOOD BY AUTOMATED COUNT: 12 % (ref 10–15)
FASTING STATUS PATIENT QL REPORTED: ABNORMAL
HBA1C MFR BLD: 5.6 % (ref 0–5.6)
HCT VFR BLD AUTO: 47.3 % (ref 35–47)
HDLC SERPL-MCNC: 50 MG/DL
HGB BLD-MCNC: 16.2 G/DL (ref 11.7–15.7)
LDLC SERPL CALC-MCNC: 181 MG/DL
MCH RBC QN AUTO: 32.5 PG (ref 26.5–33)
MCHC RBC AUTO-ENTMCNC: 34.2 G/DL (ref 31.5–36.5)
MCV RBC AUTO: 95 FL (ref 78–100)
MICROALBUMIN UR-MCNC: 13.8 MG/L
MICROALBUMIN/CREAT UR: 10.53 MG/G CR (ref 0–25)
NONHDLC SERPL-MCNC: 206 MG/DL
PLATELET # BLD AUTO: 260 10E3/UL (ref 150–450)
PROT SERPL-MCNC: 7.3 G/DL (ref 6.4–8.3)
RBC # BLD AUTO: 4.98 10E6/UL (ref 3.8–5.2)
TRIGL SERPL-MCNC: 126 MG/DL
WBC # BLD AUTO: 9 10E3/UL (ref 4–11)

## 2024-04-03 PROCEDURE — 36415 COLL VENOUS BLD VENIPUNCTURE: CPT | Performed by: FAMILY MEDICINE

## 2024-04-03 PROCEDURE — 83036 HEMOGLOBIN GLYCOSYLATED A1C: CPT | Performed by: FAMILY MEDICINE

## 2024-04-03 PROCEDURE — 99214 OFFICE O/P EST MOD 30 MIN: CPT | Mod: 25 | Performed by: FAMILY MEDICINE

## 2024-04-03 PROCEDURE — 90715 TDAP VACCINE 7 YRS/> IM: CPT | Performed by: FAMILY MEDICINE

## 2024-04-03 PROCEDURE — 82570 ASSAY OF URINE CREATININE: CPT | Performed by: FAMILY MEDICINE

## 2024-04-03 PROCEDURE — 85027 COMPLETE CBC AUTOMATED: CPT | Performed by: FAMILY MEDICINE

## 2024-04-03 PROCEDURE — 80061 LIPID PANEL: CPT | Performed by: FAMILY MEDICINE

## 2024-04-03 PROCEDURE — 82043 UR ALBUMIN QUANTITATIVE: CPT | Performed by: FAMILY MEDICINE

## 2024-04-03 PROCEDURE — 80076 HEPATIC FUNCTION PANEL: CPT | Performed by: FAMILY MEDICINE

## 2024-04-03 PROCEDURE — 99396 PREV VISIT EST AGE 40-64: CPT | Mod: 25 | Performed by: FAMILY MEDICINE

## 2024-04-03 PROCEDURE — 90471 IMMUNIZATION ADMIN: CPT | Performed by: FAMILY MEDICINE

## 2024-04-03 RX ORDER — AMLODIPINE BESYLATE 5 MG/1
5 TABLET ORAL DAILY
Qty: 90 TABLET | Refills: 4 | Status: SHIPPED | OUTPATIENT
Start: 2024-04-03

## 2024-04-03 RX ORDER — CHLORTHALIDONE 25 MG/1
25 TABLET ORAL DAILY
Qty: 90 TABLET | OUTPATIENT
Start: 2024-04-03

## 2024-04-03 RX ORDER — CHLORTHALIDONE 25 MG/1
25 TABLET ORAL DAILY
Qty: 30 TABLET | Refills: 6 | Status: SHIPPED | OUTPATIENT
Start: 2024-04-03

## 2024-04-03 ASSESSMENT — PATIENT HEALTH QUESTIONNAIRE - PHQ9: SUM OF ALL RESPONSES TO PHQ QUESTIONS 1-9: 0

## 2024-04-03 NOTE — PATIENT INSTRUCTIONS
Preventive Care Advice   This is general advice given by our system to help you stay healthy. However, your care team may have specific advice just for you. Please talk to your care team about your preventive care needs.  Nutrition  Eat 5 or more servings of fruits and vegetables each day.  Try wheat bread, brown rice and whole grain pasta (instead of white bread, rice, and pasta).  Get enough calcium and vitamin D. Check the label on foods and aim for 100% of the RDA (recommended daily allowance).  Lifestyle  Exercise at least 150 minutes each week   (30 minutes a day, 5 days a week).  Do muscle strengthening activities 2 days a week. These help control your weight and prevent disease.  No smoking.  Wear sunscreen to prevent skin cancer.  Have a dental exam and cleaning every 6 months.  Yearly exams  See your health care team every year to talk about:  Any changes in your health.  Any medicines your care team has prescribed.  Preventive care, family planning, and ways to prevent chronic diseases.  Shots (vaccines)   HPV shots (up to age 26), if you've never had them before.  Hepatitis B shots (up to age 59), if you've never had them before.  COVID-19 shot: Get this shot when it's due.  Flu shot: Get a flu shot every year.  Tetanus shot: Get a tetanus shot every 10 years.  Pneumococcal, hepatitis A, and RSV shots: Ask your care team if you need these based on your risk.  Shingles shot (for age 50 and up).  General health tests  Diabetes screening:  Starting at age 35, Get screened for diabetes at least every 3 years.  If you are younger than age 35, ask your care team if you should be screened for diabetes.  Cholesterol test: At age 39, start having a cholesterol test every 5 years, or more often if advised.  Bone density scan (DEXA): At age 50, ask your care team if you should have this scan for osteoporosis (brittle bones).  Hepatitis C: Get tested at least once in your life.  STIs (sexually transmitted  infections)  Before age 24: Ask your care team if you should be screened for STIs.  After age 24: Get screened for STIs if you're at risk. You are at risk for STIs (including HIV) if:  You are sexually active with more than one person.  You don't use condoms every time.  You or a partner was diagnosed with a sexually transmitted infection.  If you are at risk for HIV, ask about PrEP medicine to prevent HIV.  Get tested for HIV at least once in your life, whether you are at risk for HIV or not.  Cancer screening tests  Cervical cancer screening: If you have a cervix, begin getting regular cervical cancer screening tests at age 21. Most people who have regular screenings with normal results can stop after age 65. Talk about this with your provider.  Breast cancer scan (mammogram): If you've ever had breasts, begin having regular mammograms starting at age 40. This is a scan to check for breast cancer.  Colon cancer screening: It is important to start screening for colon cancer at age 45.  Have a colonoscopy test every 10 years (or more often if you're at risk) Or, ask your provider about stool tests like a FIT test every year or Cologuard test every 3 years.  To learn more about your testing options, visit: https://www.txtr/162349.pdf.  For help making a decision, visit: https://bit.ly/os61174.  Prostate cancer screening test: If you have a prostate and are age 55 to 69, ask your provider if you would benefit from a yearly prostate cancer screening test.  Lung cancer screening: If you are a current or former smoker age 50 to 80, ask your care team if ongoing lung cancer screenings are right for you.  For informational purposes only. Not to replace the advice of your health care provider. Copyright   2023 [employer]. All rights reserved. Clinically reviewed by the [facility]. [employer] 027404 - REV 01/24.    Learning About Stress  What is stress?     Stress is your  body's response to a hard situation. Your body can have a physical, emotional, or mental response. Stress is a fact of life for most people, and it affects everyone differently. What causes stress for you may not be stressful for someone else.  A lot of things can cause stress. You may feel stress when you go on a job interview, take a test, or run a race. This kind of short-term stress is normal and even useful. It can help you if you need to work hard or react quickly. For example, stress can help you finish an important job on time.  Long-term stress is caused by ongoing stressful situations or events. Examples of long-term stress include long-term health problems, ongoing problems at work, or conflicts in your family. Long-term stress can harm your health.  How does stress affect your health?  When you are stressed, your body responds as though you are in danger. It makes hormones that speed up your heart, make you breathe faster, and give you a burst of energy. This is called the fight-or-flight stress response. If the stress is over quickly, your body goes back to normal and no harm is done.  But if stress happens too often or lasts too long, it can have bad effects. Long-term stress can make you more likely to get sick, and it can make symptoms of some diseases worse. If you tense up when you are stressed, you may develop neck, shoulder, or low back pain. Stress is linked to high blood pressure and heart disease.  Stress also harms your emotional health. It can make you tariq, tense, or depressed. Your relationships may suffer, and you may not do well at work or school.  What can you do to manage stress?  You can try these things to help manage stress:   Do something active. Exercise or activity can help reduce stress. Walking is a great way to get started. Even everyday activities such as housecleaning or yard work can help.  Try yoga or harvey chi. These techniques combine exercise and meditation. You may need  some training at first to learn them.  Do something you enjoy. For example, listen to music or go to a movie. Practice your hobby or do volunteer work.  Meditate. This can help you relax, because you are not worrying about what happened before or what may happen in the future.  Do guided imagery. Imagine yourself in any setting that helps you feel calm. You can use online videos, books, or a teacher to guide you.  Do breathing exercises. For example:  From a standing position, bend forward from the waist with your knees slightly bent. Let your arms dangle close to the floor.  Breathe in slowly and deeply as you return to a standing position. Roll up slowly and lift your head last.  Hold your breath for just a few seconds in the standing position.  Breathe out slowly and bend forward from the waist.  Let your feelings out. Talk, laugh, cry, and express anger when you need to. Talking with supportive friends or family, a counselor, or a sandra leader about your feelings is a healthy way to relieve stress. Avoid discussing your feelings with people who make you feel worse.  Write. It may help to write about things that are bothering you. This helps you find out how much stress you feel and what is causing it. When you know this, you can find better ways to cope.  What can you do to prevent stress?  You might try some of these things to help prevent stress:  Manage your time. This helps you find time to do the things you want and need to do.  Get enough sleep. Your body recovers from the stresses of the day while you are sleeping.  Get support. Your family, friends, and community can make a difference in how you experience stress.  Limit your news feed. Avoid or limit time on social media or news that may make you feel stressed.  Do something active. Exercise or activity can help reduce stress. Walking is a great way to get started.  Where can you learn more?  Go to https://www.healthwise.net/patiented  Enter N032 in the  "search box to learn more about \"Learning About Stress.\"  Current as of: October 24, 2023               Content Version: 14.0    3750-0439 [employer].   Care instructions adapted under license by your healthcare professional. If you have questions about a medical condition or this instruction, always ask your healthcare professional. [employer] disclaims any warranty or liability for your use of this information.      Learning About Alcohol Use Disorder  What is alcohol use disorder?  Alcohol use disorder means that a person drinks alcohol even though it causes harm to themselves or others. It can range from mild to severe. The more symptoms of this disorder you have, the more severe it may be. People who have it may find it hard to control their use of alcohol.  People who have this disorder may argue with others about how much they're drinking. Their job may be affected because of drinking. They may drink when it's dangerous or illegal, such as when they drive. They also may have a strong need, or craving, to drink. They may feel like they must drink just to get by. Their drinking may increase their risk of getting hurt or being in a car crash.  Over time, drinking too much alcohol may cause health problems. These may include high blood pressure, liver problems, or problems with digestion.  What are the symptoms?  Maybe you've wondered about your alcohol habits or how to tell if your drinking is becoming a problem.  Here are some of the symptoms of alcohol use disorder. You may have it if you have two or more of the following symptoms:  You drink larger amounts of alcohol than you ever meant to. Or you've been drinking for a longer time than you ever meant to.  You can't cut down or control your use. Or you constantly wish you could cut down.  You spend a lot of time getting or drinking alcohol or recovering from its effects.  You have strong cravings for alcohol.  You can no longer do " your main jobs at work, at school, or at home.  You keep drinking alcohol, even though your use hurts your relationships.  You have stopped doing important activities because of your alcohol use.  You drink alcohol in situations where doing so is dangerous.  You keep drinking alcohol even though you know it's causing health problems.  You need more and more alcohol to get the same effect, or you get less effect from the same amount over time. This is called tolerance.  You have uncomfortable symptoms when you stop drinking alcohol or use less. This is called withdrawal.  Alcohol use disorder can range from mild to severe. The more symptoms you have, the more severe the disorder may be.  You might not realize that your drinking is a problem. You might not drink large amounts when you drink. Or you might go for days or weeks between drinking episodes. But even if you don't drink very often, your drinking could still be harmful and put you at risk.  How is alcohol use disorder treated?  Getting help is up to you. But you don't have to do it alone. There are many people and kinds of treatments that can help.  Treatment for alcohol use disorder can include:  Group therapy, one or more types of counseling, and alcohol education.  Medicines that help to:  Reduce withdrawal symptoms and help you safely stop drinking.  Reduce cravings for alcohol.  Support groups. These groups include Alcoholics Anonymous and [employer] (Self-Management and Recovery Training).  Some people are able to stop or cut back on drinking with help from a counselor. People who have moderate to severe alcohol use disorder may need medical treatment. They may need to stay in a hospital or treatment center.  You may have a treatment team to help you. This team may include a psychologist or psychiatrist, counselors, doctors, social workers, nurses, and a . A  helps plan and manage your treatment.  Follow-up care is a key part  "of your treatment and safety. Be sure to make and go to all appointments, and call your doctor if you are having problems. It's also a good idea to know your test results and keep a list of the medicines you take.  Where can you learn more?  Go to https://www.[employer].net/patiented  Enter H758 in the search box to learn more about \"Learning About Alcohol Use Disorder.\"  Current as of: November 15, 2023               Content Version: 14.0    2055-3977 [employer].   Care instructions adapted under license by your healthcare professional. If you have questions about a medical condition or this instruction, always ask your healthcare professional. [employer] disclaims any warranty or liability for your use of this information.      "

## 2024-04-03 NOTE — PROGRESS NOTES
Preventive Care Visit  [facility]  Judit Olivares MD, Family Medicine  Apr 3, 2024      Assessment & Plan     Routine general medical examination at a health care facility    - TDAP 10-64Y (ADACEL,BOOSTRIX)  - REVIEW OF HEALTH MAINTENANCE PROTOCOL ORDERS  - CBC with platelets    Nicotine dependence, uncomplicated, unspecified nicotine product type  Discussed  She declined tx and help  Self help discussed     Anxiety  Stable  Refills per epicare    - sertraline (ZOLOFT) 50 MG tablet  Dispense: 90 tablet; Refill: 3    Fatty liver  Will check    - **Hepatic panel FUTURE 2mo    Benign essential hypertension  Under control  Continue but will add chlorthalidone to see if this will help with fluid and lower BP a little more  Recheck in 6 months    - Albumin Random Urine Quantitative with Creat Ratio  - amLODIPine (NORVASC) 5 MG tablet  Dispense: 90 tablet; Refill: 4  - chlorthalidone (HYGROTON) 25 MG tablet  Dispense: 30 tablet; Refill: 6    CARDIOVASCULAR SCREENING; LDL GOAL LESS THAN 130    - Lipid panel reflex to direct LDL Fasting    Family history of diabetes mellitus    - Hemoglobin A1c    Acute pain of right shoulder  Will refer for further eval     - Orthopedic  Referral    Raynaud's disease without gangrene  Handout on the above diagnosis was given to the patient and discussed       Patient has been advised of split billing requirements and indicates understanding: Yes    35 minutes spent by me on the date of the encounter doing chart review, history and exam, documentation and further activities per the note      Nicotine/Tobacco Cessation  She reports that she has been smoking cigarettes. She started smoking about 29 years ago. She has a 15 pack-year smoking history. She has never used smokeless tobacco.  Nicotine/Tobacco Cessation Plan  Information offered: Patient not interested at this time      BMI  Estimated body mass index is 29.29 kg/m  as calculated from the  "following:    Height as of this encounter: 1.651 m (5' 5\").    Weight as of this encounter: 79.8 kg (176 lb).   Weight management plan: Discussed healthy diet and exercise guidelines    Counseling  Appropriate preventive services were discussed with this patient, including applicable screening as appropriate for fall prevention, nutrition, physical activity, Tobacco-use cessation, weight loss and cognition.  Checklist reviewing preventive services available has been given to the patient.  Reviewed patient's diet, addressing concerns and/or questions.   She is at risk for lack of exercise and has been provided with information to increase physical activity for the benefit of her well-being.   She is at risk for psychosocial distress and has been provided with information to reduce risk.   The patient reports drinking more than one alcoholic drink per day and sometimes engages in binge or excessive drinking. The patient was counseled and given information about possible harmful effects of excessive alcohol intake as well as where to get help for alcohol problems.     FUTURE APPOINTMENTS:       - Follow-up visit in 6 months for med check   Work on weight loss  Regular exercise  See Patient Instructions    Dana Ortiz is a 44 year old, presenting for the following:  Physical and Recheck Medication  She is also here with question about her right shoulder.   It has been hurting for 1 months.   She did not hurt it.   It is hard to reach above her head/    she also needs refills on her meds for her anxiety and her thyroid and her BP.   She wonders about something for fluid retention.   She also mentions a fair amount of stress in her life lately.   Her dad passed and she was having issues with purple fingers on the left hand.    It is a lot better now but she can still tell a little.   Cold makes it worse.    There is no pain.           4/3/2024     9:43 AM   Additional Questions   Roomed by Vidal SHAVER CMA    "       HPI    Right upper arm pain since 3 weeks            4/1/2024   General Health   How would you rate your overall physical health? (!) FAIR   Feel stress (tense, anxious, or unable to sleep) Only a little    Only a little   (!) STRESS CONCERN      4/1/2024   Nutrition   Three or more servings of calcium each day? Yes   Diet: Regular (no restrictions)   How many servings of fruit and vegetables per day? (!) 2-3   How many sweetened beverages each day? 0-1         4/1/2024   Exercise   Days per week of moderate/strenous exercise 2 days    2 days   Average minutes spent exercising at this level 30 min    30 min   (!) EXERCISE CONCERN      4/1/2024   Social Factors   Frequency of gathering with friends or relatives Twice a week    Twice a week   Worry food won't last until get money to buy more No    No   Food not last or not have enough money for food? No    No   Do you have housing?  Yes    Yes   Are you worried about losing your housing? No    No   Lack of transportation? No    No   Unable to get utilities (heat,electricity)? No    No         4/1/2024   Dental   Dentist two times every year? Yes         4/1/2024   TB Screening   Were you born outside of the [place]? No         Today's PHQ-2 Score:       4/3/2024     9:42 AM   PHQ-2 ( 1999 [employer])   Q1: Little interest or pleasure in doing things 0   Q2: Feeling down, depressed or hopeless 0   PHQ-2 Score 0   Q1: Little interest or pleasure in doing things Not at all   Q2: Feeling down, depressed or hopeless Not at all   PHQ-2 Score 0           4/1/2024   Substance Use   If I could quit smoking, I would Somewhat agree   I want to quit somking, worry about health affects Somewhat agree   Willing to make a plan to quit smoking Neutral   Willing to cut down before quitting Somewhat agree   Frequency of drinking alcohol? 4 or more times a week   Alcohol more than 3/day or more than 7/wk Yes   How often do you have a drink containing alcohol 4 or more times a week   How  many alcohol drinks on typical day 1 or 2   How often do you have 5+ drinks at one occasion Monthly   Audit 2/3 Score 2   How often not able to stop drinking once started Never   How often failed to do what normally expected Never   How often needed first drink in am after a heavy drinking session Never   How often feeling of guilt or remorse after drinking Never   How often unable to remember what happened the night before Never   Have you or someone else been injured because of your drinking No   Has anyone been concerned or suggested you cut down on drinking No   TOTAL SCORE - AUDIT 6   Do you use any other substances recreationally? No     Social History     Tobacco Use    Smoking status: Every Day     Packs/day: 0.50     Years: 26.00     Additional pack years: 0.00     Total pack years: 13.00     Types: Cigarettes     Start date: 8/1/1994    Smokeless tobacco: Never    Tobacco comments:     I have quit two times in the past.   Vaping Use    Vaping Use: Never used   Substance Use Topics    Alcohol use: Yes    Drug use: No             4/1/2024   Breast Cancer Screening   Family history of breast, colon, or ovarian cancer? No / Unknown         1/19/2024   LAST FHS-7 RESULTS   1st degree relative breast or ovarian cancer No   Any relative bilateral breast cancer No   Any male have breast cancer No   Any ONE woman have BOTH breast AND ovarian cancer No   Any woman with breast cancer before 50yrs No   2 or more relatives with breast AND/OR ovarian cancer No   2 or more relatives with breast AND/OR bowel cancer No        Mammogram Screening - Mammogram every 1-2 years updated in Health Maintenance based on mutual decision making        4/1/2024   STI Screening   New sexual partner(s) since last STI/HIV test? No     History of abnormal Pap smear: NO - age 30-65 PAP every 5 years with negative HPV co-testing recommended        Latest Ref Rng & Units 3/8/2023     1:51 PM 9/25/2020     4:00 PM 9/25/2020     2:45 PM   PAP  / HPV   PAP  Negative for Intraepithelial Lesion or Malignancy (NILM)      PAP (Historical)   NIL     HPV 16 DNA Negative Negative   Negative    HPV 18 DNA Negative Negative   Negative    Other HR HPV Negative Negative   Negative      ASCVD Risk   The 10-year ASCVD risk score (Lucian KAUFMAN, et al., 2019) is: 6%    Values used to calculate the score:      Age: 44 years      Sex: Female      Is Non- : No      Diabetic: No      Tobacco smoker: Yes      Systolic Blood Pressure: 136 mmHg      Is BP treated: Yes      HDL Cholesterol: 53 mg/dL      Total Cholesterol: 236 mg/dL        2024   Contraception/Family Planning   Questions about contraception or family planning No        Reviewed and updated as needed this visit by Provider                    Labs reviewed in EPIC  BP Readings from Last 3 Encounters:   24 136/85   24 (!) 165/93   23 (!) 151/87    Wt Readings from Last 3 Encounters:   24 79.8 kg (176 lb)   24 80.9 kg (178 lb 6.4 oz)   23 78 kg (172 lb)                  Patient Active Problem List   Diagnosis    fetal abnormality, antepartum    H/O LEEP    Anxiety    Benign essential hypertension    YAO III (cervical intraepithelial neoplasia III)    Esophageal reflux    IUD (intrauterine device) in place    Tobacco use disorder    Fatty liver     Past Surgical History:   Procedure Laterality Date     SECTION  2014    Procedure:  SECTION;   Section;  Surgeon: Diana Lyons MD;  Location: [facility]    FRACTURE SURGERY  age 5    factured both wrists roller blading    INSERT INTRAUTERINE DEVICE  2020    Mirena - also had one in [id number]    LEEP TX, CERVICAL  09       Social History     Tobacco Use    Smoking status: Every Day     Packs/day: 0.50     Years: 30.00     Additional pack years: 0.00     Total pack years: 15.00     Types: Cigarettes     Start date: 1994    Smokeless tobacco:  "Never    Tobacco comments:     I have quit two times in the past.   Substance Use Topics    Alcohol use: Yes     Family History   Problem Relation Age of Onset    Diabetes Paternal Grandfather     Diabetes Maternal Grandfather     Diabetes Father     Myocardial Infarction Father     Hypertension Father          Current Outpatient Medications   Medication Sig Dispense Refill    amLODIPine (NORVASC) 5 MG tablet Take 1 tablet (5 mg) by mouth daily 90 tablet 4    levonorgestrel (MIRENA) 20 MCG/24HR IUD 1 each (20 mcg) by Intrauterine route once      MAGNESIUM PO       Multiple Vitamin (MULTI-VITAMIN PO) Take 1 tablet by mouth daily      sertraline (ZOLOFT) 50 MG tablet Take 1 tablet (50 mg) by mouth daily 90 tablet 3         Review of Systems  Constitutional, HEENT, cardiovascular, pulmonary, gi and gu systems are negative, except as otherwise noted.     Objective    Exam  /85 (BP Location: Right arm, Patient Position: Sitting, Cuff Size: Adult Regular)   Pulse 74   Temp 98.1  F (36.7  C) (Oral)   Resp 16   Ht 1.651 m (5' 5\")   Wt 79.8 kg (176 lb)   SpO2 99%   BMI 29.29 kg/m     Estimated body mass index is 29.29 kg/m  as calculated from the following:    Height as of this encounter: 1.651 m (5' 5\").    Weight as of this encounter: 79.8 kg (176 lb).    Physical Exam  GENERAL: alert and no distress  EYES: Eyes grossly normal to inspection, PERRL and conjunctivae and sclerae normal  HENT: ear canals and TM's normal, nose and mouth without ulcers or lesions  NECK: no adenopathy, no asymmetry, masses, or scars  RESP: lungs clear to auscultation - no rales, rhonchi or wheezes  CV: regular rate and rhythm, normal S1 S2, no S3 or S4, no murmur, click or rub, no peripheral edema  ABDOMEN: soft, nontender, no hepatosplenomegaly, no masses and bowel sounds normal  MS: no gross musculoskeletal defects noted, no edema  ORTHO:   SHOULDER Exam-Right   Inspection: no swelling, no bruising, no discoloration, no obvious " deformity, no asymmetry, no glenohumeral joint anterior bulge, no distal clavicle elevation, no muscle atrophy, no scapular winging   Tenderness of: SC joint- no, clavicle(prox-mid)- no, clavicle-(mid-distal)- no, AC joint- no, acromion- no, anterior capsule- no, prox bicep tendon- no, greater tuberosity- no, prox humerus- no, supraspinatous- no, infraspinatous- no, superior trapezious- no, rhomboids- no   Range of Motion: Active- forward flexion- some pain mid and upper arch , abduction-   Range of Motion: Passive- same but less               SKIN: no suspicious lesions or rashes  NEURO: Normal strength and tone, mentation intact and speech normal  PSYCH: mentation appears normal, affect normal/bright  LYMPH: no cervical, supraclavicular, axillary, or inguinal adenopathy        Signed Electronically by: Judit Olivares MD

## 2024-05-08 NOTE — PROGRESS NOTES
ASSESSMENT & PLAN  Patient Instructions     1. Impingement syndrome of shoulder, right    2. Acute pain of right shoulder      -Patient has acute right shoulder pain due to rotator cuff tendinitis and bursitis  -Patient will start home exercise program to increase range of motion strength and function.  Handouts were given today  -We discussed medications versus cortisone injection for pain.  Patient does not want to start oral medications.  -Patient tolerated right subacromial cortisone injection today without complications.  Patient was given postprocedure instructions  -Patient may continue with sports and activities as her pain allows  -Patient will follow-up if pain returns  -Call direct clinic number [[phone]] at any time with questions or concerns.    Albert Yeo MD [facility]  [facility]        -----    SUBJECTIVE  Diana Ferrera is a/an 44 year old Right handed female who is seen in consultation at the request of  Judit Olivares M.D. for evaluation of right shoulder pain. The patient is seen by themselves.    Onset: 3 month(s) ago. Reports insidious onset without acute precipitating event.  Location of Pain: right top of shoulder  Rating of Pain at worst: 5/10  Rating of Pain Currently: 2/10  Worsened by: lifting, sleeping on it  Better with: none  Treatments tried: stretching  Associated symptoms: some limited range of motion  Orthopedic history: NO  Relevant surgical history: NO  Social history: social history: works from home on computer    Past Medical History:   Diagnosis Date    Abnormal Pap smear 1995 11/26/08, 4/16/12    Depressive disorder 1995    I've gone off and on medication most of my adult life.    Fatty liver 05/2023    History of colposcopy 1995    12/15/08    Hypertension     Rash 12/2021    right side - wonder about shingles vs reaction to naproxen     Social History     Socioeconomic History    Marital status:       Spouse name: Adama    Number of children: 1   Occupational History    Occupation: manager     Comment:    Tobacco Use    Smoking status: Every Day     Current packs/day: 0.50     Average packs/day: 0.5 packs/day for 30.0 years (15.0 ttl pk-yrs)     Types: Cigarettes     Start date: 8/1/1994    Smokeless tobacco: Never    Tobacco comments:     I have quit two times in the past.   Vaping Use    Vaping status: Never Used   Substance and Sexual Activity    Alcohol use: Yes    Drug use: No    Sexual activity: Yes     Partners: Male     Birth control/protection: I.U.D.   Other Topics Concern    Parent/sibling w/ CABG, MI or angioplasty before 65F 55M? Yes     Comment: My dad     Social Determinants of Health     Financial Resource Strain: Low Risk  (4/1/2024)    Financial Resource Strain     Within the past 12 months, have you or your family members you live with been unable to get utilities (heat, electricity) when it was really needed?: No   Food Insecurity: Low Risk  (4/1/2024)    Food Insecurity     Within the past 12 months, did you worry that your food would run out before you got money to buy more?: No     Within the past 12 months, did the food you bought just not last and you didn t have money to get more?: No   Transportation Needs: Low Risk  (4/1/2024)    Transportation Needs     Within the past 12 months, has lack of transportation kept you from medical appointments, getting your medicines, non-medical meetings or appointments, work, or from getting things that you need?: No   Physical Activity: Insufficiently Active (4/1/2024)    Exercise Vital Sign     Days of Exercise per Week: 2 days     Minutes of Exercise per Session: 30 min   Stress: No Stress Concern Present (4/1/2024)    [employer] - Occupational Stress Questionnaire     Feeling of Stress : Only a little   Social Connections: Moderately Integrated (4/1/2024)    Social Connection and Isolation Panel  "[NHANES]     Frequency of Communication with Friends and Family: More than three times a week     Frequency of Social Gatherings with Friends and Family: Twice a week     Attends Yarsani Services: 1 to 4 times per year     Active Member of Clubs or Organizations: No     Attends Club or Organization Meetings: Never     Marital Status:    Interpersonal Safety: Low Risk  (4/3/2024)    Interpersonal Safety     Do you feel physically and emotionally safe where you currently live?: Yes     Within the past 12 months, have you been hit, slapped, kicked or otherwise physically hurt by someone?: No     Within the past 12 months, have you been humiliated or emotionally abused in other ways by your partner or ex-partner?: No   Housing Stability: Low Risk  (4/1/2024)    Housing Stability     Do you have housing? : Yes     Are you worried about losing your housing?: No         Patient's past medical, surgical, social, and family histories were reviewed today and no changes are noted.    REVIEW OF SYSTEMS:  10 point ROS is negative other than symptoms noted above in HPI, Past Medical History or as stated below  Constitutional: NEGATIVE for fever, chills, change in weight  Skin: NEGATIVE for worrisome rashes, moles or lesions  GI/: NEGATIVE for bowel or bladder changes  Neuro: NEGATIVE for weakness, dizziness or paresthesias    OBJECTIVE:  /82   Ht 1.651 m (5' 5\")   Wt 79.8 kg (176 lb)   BMI 29.29 kg/m     General: healthy, alert and in no distress  HEENT: no scleral icterus or conjunctival erythema  Skin: no suspicious lesions or rash. No jaundice.  CV: regular rhythm by palpation  Resp: normal respiratory effort without conversational dyspnea   Psych: normal mood and affect  Gait: normal steady gait with appropriate coordination and balance  Neuro: normal light touch sensory exam of the bilateral upper extremities.    MSK:  RIGHT SHOULDER  Inspection:    no swelling, bruising, discoloration, or obvious " deformity or asymmetry  Palpation:    Tender about the supraspinatus insertion. Remainder of bony and tendinous landmarks are nontender.  Active Range of Motion:     Abduction 1350, FF 1350, , IR L2.      Scapular dyskinesis absent  Strength:    Scapular plane abduction painful,  ER grossly intact, IR grossly intact, biceps not tested, triceps not tested  Special Tests:    Positive: Neer's, Mccoy', supraspinatus (empty can), crossed arm adduction, and Brewster's    Negative: drop arm/painful arc, Speed's, and Yergason's        Independent visualization of the below image:  No results found for this or any previous visit (from the past 24 hour(s)).    Large Joint Injection/Arthocentesis: R subacromial bursa    Date/Time: 5/10/2024 8:28 AM    Performed by: Yeo, Albert, MD  Authorized by: Yeo, Albert, MD    Indications:  Pain  Needle Size:  25 G  Guidance: landmark guided    Approach:  Posterior  Location:  Shoulder      Site:  R subacromial bursa  Medications:  40 mg methylPREDNISolone 40 MG/ML; 4 mL lidocaine 1 %  Outcome:  Tolerated well, no immediate complications  Procedure discussed: discussed risks, benefits, and alternatives    Consent Given by:  Patient  Timeout: timeout called immediately prior to procedure    Prep: patient was prepped and draped in usual sterile fashion          Albert Yeo MD [facility]

## 2024-05-10 ENCOUNTER — OFFICE VISIT (OUTPATIENT)
Dept: ORTHOPEDICS | Facility: CLINIC | Age: 44
End: 2024-05-10
Attending: FAMILY MEDICINE
Payer: COMMERCIAL

## 2024-05-10 VITALS
DIASTOLIC BLOOD PRESSURE: 82 MMHG | HEIGHT: 65 IN | BODY MASS INDEX: 29.32 KG/M2 | SYSTOLIC BLOOD PRESSURE: 126 MMHG | WEIGHT: 176 LBS

## 2024-05-10 DIAGNOSIS — M25.511 ACUTE PAIN OF RIGHT SHOULDER: ICD-10-CM

## 2024-05-10 DIAGNOSIS — M75.41 IMPINGEMENT SYNDROME OF SHOULDER, RIGHT: Primary | ICD-10-CM

## 2024-05-10 PROCEDURE — 99203 OFFICE O/P NEW LOW 30 MIN: CPT | Mod: 25 | Performed by: FAMILY MEDICINE

## 2024-05-10 PROCEDURE — 20610 DRAIN/INJ JOINT/BURSA W/O US: CPT | Mod: RT | Performed by: FAMILY MEDICINE

## 2024-05-10 RX ORDER — METHYLPREDNISOLONE ACETATE 40 MG/ML
40 INJECTION, SUSPENSION INTRA-ARTICULAR; INTRALESIONAL; INTRAMUSCULAR; SOFT TISSUE
Status: SHIPPED | OUTPATIENT
Start: 2024-05-10

## 2024-05-10 RX ORDER — LIDOCAINE HYDROCHLORIDE 10 MG/ML
4 INJECTION, SOLUTION INFILTRATION; PERINEURAL
Status: SHIPPED | OUTPATIENT
Start: 2024-05-10

## 2024-05-10 RX ADMIN — METHYLPREDNISOLONE ACETATE 40 MG: 40 INJECTION, SUSPENSION INTRA-ARTICULAR; INTRALESIONAL; INTRAMUSCULAR; SOFT TISSUE at 08:28

## 2024-05-10 RX ADMIN — LIDOCAINE HYDROCHLORIDE 4 ML: 10 INJECTION, SOLUTION INFILTRATION; PERINEURAL at 08:28

## 2024-05-10 NOTE — LETTER
5/10/2024         RE: Diana Ferrera  [address]  [facility] [address]        Dear Colleague,    Thank you for referring your patient, Diana Ferrera, to the [facility] [place]. Please see a copy of my visit note below.    ASSESSMENT & PLAN  Patient Instructions     1. Impingement syndrome of shoulder, right    2. Acute pain of right shoulder      -Patient has acute right shoulder pain due to rotator cuff tendinitis and bursitis  -Patient will start home exercise program to increase range of motion strength and function.  Handouts were given today  -We discussed medications versus cortisone injection for pain.  Patient does not want to start oral medications.  -Patient tolerated right subacromial cortisone injection today without complications.  Patient was given postprocedure instructions  -Patient may continue with sports and activities as her pain allows  -Patient will follow-up if pain returns  -Call direct clinic number [[phone]] at any time with questions or concerns.    Albert Yeo MD [facility]  [facility] [place]        -----    SUBJECTIVE  Diana Ferrera is a/an 44 year old Right handed female who is seen in consultation at the request of  Judit Olivares M.D. for evaluation of right shoulder pain. The patient is seen by themselves.    Onset: 3 month(s) ago. Reports insidious onset without acute precipitating event.  Location of Pain: right top of shoulder  Rating of Pain at worst: 5/10  Rating of Pain Currently: 2/10  Worsened by: lifting, sleeping on it  Better with: none  Treatments tried: stretching  Associated symptoms: some limited range of motion  Orthopedic history: NO  Relevant surgical history: NO  Social history: social history: works from home on computer    Past Medical History:   Diagnosis Date     Abnormal Pap smear 1995 11/26/08, 4/16/12     Depressive disorder 1995    I've gone off and on  medication most of my adult life.     Fatty liver 05/2023     History of colposcopy 1995    12/15/08     Hypertension      Rash 12/2021    right side - wonder about shingles vs reaction to naproxen     Social History     Socioeconomic History     Marital status:      Spouse name: Adama     Number of children: 1   Occupational History     Occupation: manager     Comment:    Tobacco Use     Smoking status: Every Day     Current packs/day: 0.50     Average packs/day: 0.5 packs/day for 30.0 years (15.0 ttl pk-yrs)     Types: Cigarettes     Start date: 8/1/1994     Smokeless tobacco: Never     Tobacco comments:     I have quit two times in the past.   Vaping Use     Vaping status: Never Used   Substance and Sexual Activity     Alcohol use: Yes     Drug use: No     Sexual activity: Yes     Partners: Male     Birth control/protection: I.U.D.   Other Topics Concern     Parent/sibling w/ CABG, MI or angioplasty before 65F 55M? Yes     Comment: My dad     Social Determinants of Health     Financial Resource Strain: Low Risk  (4/1/2024)    Financial Resource Strain      Within the past 12 months, have you or your family members you live with been unable to get utilities (heat, electricity) when it was really needed?: No   Food Insecurity: Low Risk  (4/1/2024)    Food Insecurity      Within the past 12 months, did you worry that your food would run out before you got money to buy more?: No      Within the past 12 months, did the food you bought just not last and you didn t have money to get more?: No   Transportation Needs: Low Risk  (4/1/2024)    Transportation Needs      Within the past 12 months, has lack of transportation kept you from medical appointments, getting your medicines, non-medical meetings or appointments, work, or from getting things that you need?: No   Physical Activity: Insufficiently Active (4/1/2024)    Exercise Vital Sign      Days of Exercise per Week: 2 days      Minutes of  "Exercise per Session: 30 min   Stress: No Stress Concern Present (4/1/2024)    [employer] [place] [employer] - Occupational Stress Questionnaire      Feeling of Stress : Only a little   Social Connections: Moderately Integrated (4/1/2024)    Social Connection and Isolation Panel [NHANES]      Frequency of Communication with Friends and Family: More than three times a week      Frequency of Social Gatherings with Friends and Family: Twice a week      Attends Congregational Services: 1 to 4 times per year      Active Member of Clubs or Organizations: No      Attends Club or Organization Meetings: Never      Marital Status:    Interpersonal Safety: Low Risk  (4/3/2024)    Interpersonal Safety      Do you feel physically and emotionally safe where you currently live?: Yes      Within the past 12 months, have you been hit, slapped, kicked or otherwise physically hurt by someone?: No      Within the past 12 months, have you been humiliated or emotionally abused in other ways by your partner or ex-partner?: No   Housing Stability: Low Risk  (4/1/2024)    Housing Stability      Do you have housing? : Yes      Are you worried about losing your housing?: No         Patient's past medical, surgical, social, and family histories were reviewed today and no changes are noted.    REVIEW OF SYSTEMS:  10 point ROS is negative other than symptoms noted above in HPI, Past Medical History or as stated below  Constitutional: NEGATIVE for fever, chills, change in weight  Skin: NEGATIVE for worrisome rashes, moles or lesions  GI/: NEGATIVE for bowel or bladder changes  Neuro: NEGATIVE for weakness, dizziness or paresthesias    OBJECTIVE:  /82   Ht 1.651 m (5' 5\")   Wt 79.8 kg (176 lb)   BMI 29.29 kg/m     General: healthy, alert and in no distress  HEENT: no scleral icterus or conjunctival erythema  Skin: no suspicious lesions or rash. No jaundice.  CV: regular rhythm by palpation  Resp: normal respiratory effort " without conversational dyspnea   Psych: normal mood and affect  Gait: normal steady gait with appropriate coordination and balance  Neuro: normal light touch sensory exam of the bilateral upper extremities.    MSK:  RIGHT SHOULDER  Inspection:    no swelling, bruising, discoloration, or obvious deformity or asymmetry  Palpation:    Tender about the supraspinatus insertion. Remainder of bony and tendinous landmarks are nontender.  Active Range of Motion:     Abduction 1350, FF 1350, , IR L2.      Scapular dyskinesis absent  Strength:    Scapular plane abduction painful,  ER grossly intact, IR grossly intact, biceps not tested, triceps not tested  Special Tests:    Positive: Neer's, Mccoy', supraspinatus (empty can), crossed arm adduction, and De Leon's    Negative: drop arm/painful arc, Speed's, and Yergason's        Independent visualization of the below image:  No results found for this or any previous visit (from the past 24 hour(s)).    Large Joint Injection/Arthocentesis: R subacromial bursa    Date/Time: 5/10/2024 8:28 AM    Performed by: Yeo, Albert, MD  Authorized by: Yeo, Albert, MD    Indications:  Pain  Needle Size:  25 G  Guidance: landmark guided    Approach:  Posterior  Location:  Shoulder      Site:  R subacromial bursa  Medications:  40 mg methylPREDNISolone 40 MG/ML; 4 mL lidocaine 1 %  Outcome:  Tolerated well, no immediate complications  Procedure discussed: discussed risks, benefits, and alternatives    Consent Given by:  Patient  Timeout: timeout called immediately prior to procedure    Prep: patient was prepped and draped in usual sterile fashion          Albert Yeo MD [facility]      Again, thank you for allowing me to participate in the care of your patient.        Sincerely,        Albert Yeo, MD

## 2024-05-10 NOTE — PATIENT INSTRUCTIONS
1. Impingement syndrome of shoulder, right    2. Acute pain of right shoulder      -Patient has acute right shoulder pain due to rotator cuff tendinitis and bursitis  -Patient will start home exercise program to increase range of motion strength and function.  Handouts were given today  -We discussed medications versus cortisone injection for pain.  Patient does not want to start oral medications.  -Patient tolerated right subacromial cortisone injection today without complications.  Patient was given postprocedure instructions  -Patient may continue with sports and activities as her pain allows  -Patient will follow-up if pain returns  -Call direct clinic number [[phone]] at any time with questions or concerns.    Albert Yeo MD [facility]  [facility] [place]

## 2024-05-25 DIAGNOSIS — I10 BENIGN ESSENTIAL HYPERTENSION: ICD-10-CM

## 2024-05-29 RX ORDER — AMLODIPINE BESYLATE 5 MG/1
5 TABLET ORAL DAILY
Qty: 90 TABLET | Refills: 4 | OUTPATIENT
Start: 2024-05-29

## 2024-05-29 NOTE — TELEPHONE ENCOUNTER
Pharmacy requested refills that are already active on file. Refused request to pharmacy.   [Initial Visit ___] : an initial visit for [unfilled] [Questionnaire Received] : Patient questionnaire received [Intake Form Reviewed] : Patient intake form with past medical history, surgical history, family history and social history reviewed today [Pelvic Organ Prolapse] : pelvic organ prolapse

## 2024-08-13 ENCOUNTER — LAB (OUTPATIENT)
Dept: LAB | Facility: CLINIC | Age: 44
End: 2024-08-13
Payer: COMMERCIAL

## 2024-08-13 DIAGNOSIS — Z13.6 CARDIOVASCULAR SCREENING; LDL GOAL LESS THAN 130: ICD-10-CM

## 2024-08-13 DIAGNOSIS — Z82.49 FAMILY HISTORY OF ISCHEMIC HEART DISEASE: ICD-10-CM

## 2024-08-13 LAB
CHOLEST SERPL-MCNC: 245 MG/DL
FASTING STATUS PATIENT QL REPORTED: YES
HDLC SERPL-MCNC: 46 MG/DL
LDLC SERPL CALC-MCNC: 174 MG/DL
NONHDLC SERPL-MCNC: 199 MG/DL
TRIGL SERPL-MCNC: 126 MG/DL

## 2024-08-13 PROCEDURE — 36415 COLL VENOUS BLD VENIPUNCTURE: CPT

## 2024-08-13 PROCEDURE — 80061 LIPID PANEL: CPT

## 2024-11-19 DIAGNOSIS — I10 BENIGN ESSENTIAL HYPERTENSION: ICD-10-CM

## 2024-11-19 RX ORDER — CHLORTHALIDONE 25 MG/1
25 TABLET ORAL DAILY
Qty: 90 TABLET | Refills: 0 | Status: SHIPPED | OUTPATIENT
Start: 2024-11-19

## 2025-01-24 ENCOUNTER — ANCILLARY PROCEDURE (OUTPATIENT)
Dept: MAMMOGRAPHY | Facility: CLINIC | Age: 45
End: 2025-01-24
Attending: FAMILY MEDICINE
Payer: COMMERCIAL

## 2025-01-24 DIAGNOSIS — Z12.31 VISIT FOR SCREENING MAMMOGRAM: ICD-10-CM

## 2025-01-24 PROCEDURE — 77063 BREAST TOMOSYNTHESIS BI: CPT | Mod: TC | Performed by: RADIOLOGY

## 2025-01-24 PROCEDURE — 77067 SCR MAMMO BI INCL CAD: CPT | Mod: TC | Performed by: RADIOLOGY

## 2025-02-17 DIAGNOSIS — Z83.3 FAMILY HISTORY OF DIABETES MELLITUS: ICD-10-CM

## 2025-02-17 DIAGNOSIS — K76.0 FATTY LIVER: Primary | ICD-10-CM

## 2025-02-17 DIAGNOSIS — I10 BENIGN ESSENTIAL HYPERTENSION: ICD-10-CM

## 2025-02-17 RX ORDER — CHLORTHALIDONE 25 MG/1
25 TABLET ORAL DAILY
Qty: 90 TABLET | Refills: 0 | Status: SHIPPED | OUTPATIENT
Start: 2025-02-17

## 2025-02-17 NOTE — TELEPHONE ENCOUNTER
LVM for patient to call the clinic at [phone] Medication has been refilled but due for a lav appt.  Lilian Francisco, TC

## 2025-04-07 ENCOUNTER — OFFICE VISIT (OUTPATIENT)
Dept: FAMILY MEDICINE | Facility: CLINIC | Age: 45
End: 2025-04-07
Attending: FAMILY MEDICINE
Payer: COMMERCIAL

## 2025-04-07 VITALS
HEART RATE: 82 BPM | TEMPERATURE: 97.6 F | BODY MASS INDEX: 29.99 KG/M2 | HEIGHT: 65 IN | OXYGEN SATURATION: 100 % | SYSTOLIC BLOOD PRESSURE: 102 MMHG | WEIGHT: 180 LBS | DIASTOLIC BLOOD PRESSURE: 60 MMHG | RESPIRATION RATE: 14 BRPM

## 2025-04-07 DIAGNOSIS — G56.02 CARPAL TUNNEL SYNDROME OF LEFT WRIST: ICD-10-CM

## 2025-04-07 DIAGNOSIS — Z00.00 ROUTINE GENERAL MEDICAL EXAMINATION AT A HEALTH CARE FACILITY: Primary | ICD-10-CM

## 2025-04-07 DIAGNOSIS — I10 BENIGN ESSENTIAL HYPERTENSION: ICD-10-CM

## 2025-04-07 DIAGNOSIS — Z83.3 FAMILY HISTORY OF DIABETES MELLITUS: ICD-10-CM

## 2025-04-07 DIAGNOSIS — N95.1 MENOPAUSAL SYNDROME (HOT FLASHES): ICD-10-CM

## 2025-04-07 DIAGNOSIS — K76.0 FATTY LIVER: ICD-10-CM

## 2025-04-07 DIAGNOSIS — F41.9 ANXIETY: ICD-10-CM

## 2025-04-07 DIAGNOSIS — F17.200 NICOTINE DEPENDENCE, UNCOMPLICATED, UNSPECIFIED NICOTINE PRODUCT TYPE: ICD-10-CM

## 2025-04-07 LAB
ALBUMIN SERPL BCG-MCNC: 4.5 G/DL (ref 3.5–5.2)
ALP SERPL-CCNC: 66 U/L (ref 40–150)
ALT SERPL W P-5'-P-CCNC: 46 U/L (ref 0–50)
ANION GAP SERPL CALCULATED.3IONS-SCNC: 13 MMOL/L (ref 7–15)
AST SERPL W P-5'-P-CCNC: 37 U/L (ref 0–45)
BILIRUB SERPL-MCNC: 0.7 MG/DL
BUN SERPL-MCNC: 10.8 MG/DL (ref 6–20)
CALCIUM SERPL-MCNC: 10.1 MG/DL (ref 8.8–10.4)
CHLORIDE SERPL-SCNC: 100 MMOL/L (ref 98–107)
CHOLEST SERPL-MCNC: 233 MG/DL
CREAT SERPL-MCNC: 0.72 MG/DL (ref 0.51–0.95)
CREAT UR-MCNC: 230 MG/DL
EGFRCR SERPLBLD CKD-EPI 2021: >90 ML/MIN/1.73M2
ERYTHROCYTE [DISTWIDTH] IN BLOOD BY AUTOMATED COUNT: 12.8 % (ref 10–15)
EST. AVERAGE GLUCOSE BLD GHB EST-MCNC: 105 MG/DL
FASTING STATUS PATIENT QL REPORTED: YES
FASTING STATUS PATIENT QL REPORTED: YES
FSH SERPL IRP2-ACNC: 75.5 MIU/ML
GLUCOSE SERPL-MCNC: 110 MG/DL (ref 70–99)
HBA1C MFR BLD: 5.3 % (ref 0–5.6)
HCO3 SERPL-SCNC: 29 MMOL/L (ref 22–29)
HCT VFR BLD AUTO: 46.3 % (ref 35–47)
HDLC SERPL-MCNC: 50 MG/DL
HGB BLD-MCNC: 16 G/DL (ref 11.7–15.7)
LDLC SERPL CALC-MCNC: 162 MG/DL
MCH RBC QN AUTO: 32.9 PG (ref 26.5–33)
MCHC RBC AUTO-ENTMCNC: 34.6 G/DL (ref 31.5–36.5)
MCV RBC AUTO: 95 FL (ref 78–100)
MICROALBUMIN UR-MCNC: 15.6 MG/L
MICROALBUMIN/CREAT UR: 6.78 MG/G CR (ref 0–25)
NONHDLC SERPL-MCNC: 183 MG/DL
PLATELET # BLD AUTO: 262 10E3/UL (ref 150–450)
POTASSIUM SERPL-SCNC: 3.8 MMOL/L (ref 3.4–5.3)
PROT SERPL-MCNC: 7.4 G/DL (ref 6.4–8.3)
RBC # BLD AUTO: 4.86 10E6/UL (ref 3.8–5.2)
SODIUM SERPL-SCNC: 142 MMOL/L (ref 135–145)
TRIGL SERPL-MCNC: 104 MG/DL
WBC # BLD AUTO: 7 10E3/UL (ref 4–11)

## 2025-04-07 PROCEDURE — 3074F SYST BP LT 130 MM HG: CPT | Performed by: FAMILY MEDICINE

## 2025-04-07 PROCEDURE — 99214 OFFICE O/P EST MOD 30 MIN: CPT | Mod: 25 | Performed by: FAMILY MEDICINE

## 2025-04-07 PROCEDURE — 85027 COMPLETE CBC AUTOMATED: CPT | Performed by: FAMILY MEDICINE

## 2025-04-07 PROCEDURE — 80053 COMPREHEN METABOLIC PANEL: CPT | Performed by: FAMILY MEDICINE

## 2025-04-07 PROCEDURE — 83036 HEMOGLOBIN GLYCOSYLATED A1C: CPT | Performed by: FAMILY MEDICINE

## 2025-04-07 PROCEDURE — 80061 LIPID PANEL: CPT | Performed by: FAMILY MEDICINE

## 2025-04-07 PROCEDURE — 36415 COLL VENOUS BLD VENIPUNCTURE: CPT | Performed by: FAMILY MEDICINE

## 2025-04-07 PROCEDURE — 82043 UR ALBUMIN QUANTITATIVE: CPT | Performed by: FAMILY MEDICINE

## 2025-04-07 PROCEDURE — 99406 BEHAV CHNG SMOKING 3-10 MIN: CPT | Performed by: FAMILY MEDICINE

## 2025-04-07 PROCEDURE — 99396 PREV VISIT EST AGE 40-64: CPT | Performed by: FAMILY MEDICINE

## 2025-04-07 PROCEDURE — 83001 ASSAY OF GONADOTROPIN (FSH): CPT | Performed by: FAMILY MEDICINE

## 2025-04-07 PROCEDURE — 3078F DIAST BP <80 MM HG: CPT | Performed by: FAMILY MEDICINE

## 2025-04-07 PROCEDURE — 82570 ASSAY OF URINE CREATININE: CPT | Performed by: FAMILY MEDICINE

## 2025-04-07 RX ORDER — VARENICLINE TARTRATE 0.5 MG/1
TABLET, FILM COATED ORAL
Qty: 95 TABLET | Refills: 0 | Status: SHIPPED | OUTPATIENT
Start: 2025-04-07

## 2025-04-07 RX ORDER — VARENICLINE TARTRATE 1 MG/1
1 TABLET, FILM COATED ORAL 2 TIMES DAILY
Qty: 60 TABLET | Refills: 1 | Status: SHIPPED | OUTPATIENT
Start: 2025-05-06

## 2025-04-07 SDOH — HEALTH STABILITY: PHYSICAL HEALTH: ON AVERAGE, HOW MANY DAYS PER WEEK DO YOU ENGAGE IN MODERATE TO STRENUOUS EXERCISE (LIKE A BRISK WALK)?: 2 DAYS

## 2025-04-07 SDOH — HEALTH STABILITY: PHYSICAL HEALTH: ON AVERAGE, HOW MANY MINUTES DO YOU ENGAGE IN EXERCISE AT THIS LEVEL?: 40 MIN

## 2025-04-07 ASSESSMENT — SOCIAL DETERMINANTS OF HEALTH (SDOH): HOW OFTEN DO YOU GET TOGETHER WITH FRIENDS OR RELATIVES?: THREE TIMES A WEEK

## 2025-04-07 NOTE — PATIENT INSTRUCTIONS
Patient Education   Preventive Care Advice   This is general advice given by our system to help you stay healthy. However, your care team may have specific advice just for you. Please talk to your care team about your preventive care needs.  Nutrition  Eat 5 or more servings of fruits and vegetables each day.  Try wheat bread, brown rice and whole grain pasta (instead of white bread, rice, and pasta).  Get enough calcium and vitamin D. Check the label on foods and aim for 100% of the RDA (recommended daily allowance).  Lifestyle  Exercise at least 150 minutes each week  (30 minutes a day, 5 days a week).  Do muscle strengthening activities 2 days a week. These help control your weight and prevent disease.  No smoking.  Wear sunscreen to prevent skin cancer.  Have a dental exam and cleaning every 6 months.  Yearly exams  See your health care team every year to talk about:  Any changes in your health.  Any medicines your care team has prescribed.  Preventive care, family planning, and ways to prevent chronic diseases.  Shots (vaccines)   HPV shots (up to age 26), if you've never had them before.  Hepatitis B shots (up to age 59), if you've never had them before.  COVID-19 shot: Get this shot when it's due.  Flu shot: Get a flu shot every year.  Tetanus shot: Get a tetanus shot every 10 years.  Pneumococcal, hepatitis A, and RSV shots: Ask your care team if you need these based on your risk.  Shingles shot (for age 50 and up)  General health tests  Diabetes screening:  Starting at age 35, Get screened for diabetes at least every 3 years.  If you are younger than age 35, ask your care team if you should be screened for diabetes.  Cholesterol test: At age 39, start having a cholesterol test every 5 years, or more often if advised.  Bone density scan (DEXA): At age 50, ask your care team if you should have this scan for osteoporosis (brittle bones).  Hepatitis C: Get tested at least once in your life.  STIs (sexually  transmitted infections)  Before age 24: Ask your care team if you should be screened for STIs.  After age 24: Get screened for STIs if you're at risk. You are at risk for STIs (including HIV) if:  You are sexually active with more than one person.  You don't use condoms every time.  You or a partner was diagnosed with a sexually transmitted infection.  If you are at risk for HIV, ask about PrEP medicine to prevent HIV.  Get tested for HIV at least once in your life, whether you are at risk for HIV or not.  Cancer screening tests  Cervical cancer screening: If you have a cervix, begin getting regular cervical cancer screening tests starting at age 21.  Breast cancer scan (mammogram): If you've ever had breasts, begin having regular mammograms starting at age 40. This is a scan to check for breast cancer.  Colon cancer screening: It is important to start screening for colon cancer at age 45.  Have a colonoscopy test every 10 years (or more often if you're at risk) Or, ask your provider about stool tests like a FIT test every year or Cologuard test every 3 years.  To learn more about your testing options, visit:   .  For help making a decision, visit:   https://bit.ly/sf23375.  Prostate cancer screening test: If you have a prostate, ask your care team if a prostate cancer screening test (PSA) at age 55 is right for you.  Lung cancer screening: If you are a current or former smoker ages 50 to 80, ask your care team if ongoing lung cancer screenings are right for you.  For informational purposes only. Not to replace the advice of your health care provider. Copyright   2023 [facility]. All rights reserved. Clinically reviewed by the [employer] Transitions Program. [employer] 964613 - REV 01/24.  9 Ways to Cut Back on Drinking  Maybe you've found yourself drinking more alcohol than you'd prefer. If you want to cut back, here are some ideas to try.    Think before you drink.  Do you really want a drink,  "or is it just a habit? If you're used to having a drink at a certain time, try doing something else then.     Look for substitutes.  Find some no-alcohol drinks that you enjoy, like flavored seltzer water, tea with honey, or tonic with a slice of lime. Or try alcohol-free beer or \"virgin\" cocktails (without the alcohol).     Drink more water.  Use water to quench your thirst. Drink a glass of water before you have any alcohol. Have another glass along with every drink or between drinks.     Shrink your drink.  For example, have a bottle of beer instead of a pint. Use a smaller glass for wine. Choose drinks with lower alcohol content (ABV%). Or use less liquor and more mixer in cocktails.     Slow down.  It's easy to drink quickly and without thinking about it. Pay attention, and make each drink last longer.     Do the math.  Total up how much you spend on alcohol each month. How much is that a year? If you cut back, what could you do with the money you save?     Take a break.  Choose a day or two each week when you won't drink at all. Notice how you feel on those days, physically and emotionally. How did you sleep? Do you feel better? Over time, add more break days.     Count calories.  Would you like to lose some weight? For some people that's a good motivator for cutting back. Figure out how many calories are in each drink. How many does that add up to in a day? In a week? In a month?     Practice saying no.  Be ready when someone offers you a drink. Try: \"Thanks, I've had enough.\" Or \"Thanks, but I'm cutting back.\" Or \"No, thanks. I feel better when I drink less.\"   Current as of: August 20, 2024  Content Version: 14.4 2024-2025 [employer].   Care instructions adapted under license by your healthcare professional. If you have questions about a medical condition or this instruction, always ask your healthcare professional. [employer] disclaims any warranty or liability for your use of " this information.  Substance Use Disorder: Care Instructions  Overview     You can improve your life and health by stopping your use of alcohol or drugs. When you don't drink or use drugs, you may feel and sleep better. You may get along better with your family, friends, and coworkers. There are medicines and programs that can help with substance use disorder.  How can you care for yourself at home?  Here are some ways to help you stay sober and prevent relapse.  If you have been given medicine to help keep you sober or reduce your cravings, be sure to take it exactly as prescribed.  Talk to your doctor about programs that can help you stop using drugs or drinking alcohol.  Do not keep alcohol or drugs in your home.  Plan ahead. Think about what you'll say if other people ask you to drink or use drugs. Try not to spend time with people who drink or use drugs.  Use the time and money spent on drinking or drugs to do something that's important to you.  Preventing a relapse  Have a plan to deal with relapse. Learn to recognize changes in your thinking that lead you to drink or use drugs. Get help before you start to drink or use drugs again.  Try to stay away from situations, friends, or places that may lead you to drink or use drugs.  If you feel the need to drink alcohol or use drugs again, seek help right away. Call a trusted friend or family member. Some people get support from organizations such as Narcotics Anonymous or [place] or from treatment facilities.  If you relapse, get help as soon as you can. Some people make a plan with another person that outlines what they want that person to do for them if they relapse. The plan usually includes how to handle the relapse and who to notify in case of relapse.  Don't give up. Remember that a relapse doesn't mean that you have failed. Use the experience to learn the triggers that lead you to drink or use drugs. Then quit again. Recovery is a lifelong process.  "Many people have several relapses before they are able to quit for good.  Follow-up care is a key part of your treatment and safety. Be sure to make and go to all appointments, and call your doctor if you are having problems. It's also a good idea to know your test results and keep a list of the medicines you take.  When should you call for help?   Call 911  anytime you think you may need emergency care. For example, call if you or someone else:    Has overdosed or has withdrawal signs. Be sure to tell the emergency workers that you are or someone else is using or trying to quit using drugs. Overdose or withdrawal signs may include:  Losing consciousness.  Seizure.  Seeing or hearing things that aren't there (hallucinations).     Is thinking or talking about suicide or harming others.   Where to get help 24 hours a day, 7 days a week   If you or someone you know talks about suicide, self-harm, a mental health crisis, a substance use crisis, or any other kind of emotional distress, get help right away. You can:    Call the Suicide and Crisis Lifeline at 988.     Call 2-713-519-BTIU (1-860.747.7272).     Text HOME to 255648 to access the Crisis Text Line.   Consider saving these numbers in your phone.  Go to [employer].org for more information or to chat online.  Call your doctor now or seek immediate medical care if:    You are having withdrawal symptoms. These may include nausea or vomiting, sweating, shakiness, and anxiety.   Watch closely for changes in your health, and be sure to contact your doctor if:    You have a relapse.     You need more help or support to stop.   Where can you learn more?  Go to https://www.healthcielo24.net/patiented  Enter H573 in the search box to learn more about \"Substance Use Disorder: Care Instructions.\"  Current as of: August 20, 2024  Content Version: 14.4    1207-0755 [employer].   Care instructions adapted under license by your healthcare professional. If you have " questions about a medical condition or this instruction, always ask your healthcare professional. [employer], [employer] disclaims any warranty or liability for your use of this information.

## 2025-05-14 ENCOUNTER — TRANSFERRED RECORDS (OUTPATIENT)
Dept: HEALTH INFORMATION MANAGEMENT | Facility: CLINIC | Age: 45
End: 2025-05-14
Payer: COMMERCIAL

## 2025-05-17 DIAGNOSIS — I10 BENIGN ESSENTIAL HYPERTENSION: ICD-10-CM

## 2025-05-19 RX ORDER — CHLORTHALIDONE 25 MG/1
25 TABLET ORAL DAILY
Qty: 90 TABLET | Refills: 2 | Status: SHIPPED | OUTPATIENT
Start: 2025-05-19